# Patient Record
Sex: FEMALE | Race: ASIAN | Employment: UNEMPLOYED | URBAN - METROPOLITAN AREA
[De-identification: names, ages, dates, MRNs, and addresses within clinical notes are randomized per-mention and may not be internally consistent; named-entity substitution may affect disease eponyms.]

---

## 2017-01-09 ENCOUNTER — APPOINTMENT (OUTPATIENT)
Dept: PHYSICAL THERAPY | Facility: HOSPITAL | Age: 32
End: 2017-01-09
Attending: ORTHOPAEDIC SURGERY
Payer: MEDICAID

## 2017-01-11 ENCOUNTER — HOSPITAL ENCOUNTER (OUTPATIENT)
Dept: PHYSICAL THERAPY | Facility: HOSPITAL | Age: 32
Setting detail: THERAPIES SERIES
Discharge: HOME | End: 2017-01-11
Attending: ORTHOPAEDIC SURGERY
Payer: MEDICAID

## 2017-01-11 PROCEDURE — 97161 PT EVAL LOW COMPLEX 20 MIN: CPT

## 2017-01-11 NOTE — PROGRESS NOTES
LOWER EXTREMITY EVALUATION:   Referring Physician: Dr. Malik Bruce  Diagnosis: Vilma Neelylasha Date of Service: 1/11/2017     PATIENT SUMMARY   Stefani Sosa is a 33year old y/o female who presents to therapy today with complaints of R leg pain from h October 2014. Pt goals include improving the pain. Past medical history was reviewed with Gi. Significant findings are unremarkable.     ASSESSMENT  She presents to outpatient skilled physical therapy today with full and pain-free BLE ROM, significantly 5/5; L 5/5       Special tests:   Varus Stress Test: R negative, L negative  Valgus Stress Test: R negative, L negative  Lachman Test: R negative, L negative  Anterior Drawer Test: R negative, L negative  Posterior Drawer Test: R negative, L negative  McMu indicating significantly improved R knee pain and function. NEW GOAL     Frequency / Duration: Patient will be seen for 2 x/week or a total of 8 visits over a 90 day period. Treatment will include: Manual Therapy; Therapeutic Exercises;  Neuromuscular Re-e

## 2017-01-18 ENCOUNTER — HOSPITAL ENCOUNTER (OUTPATIENT)
Dept: PHYSICAL THERAPY | Facility: HOSPITAL | Age: 32
Setting detail: THERAPIES SERIES
Discharge: HOME | End: 2017-01-18
Attending: ORTHOPAEDIC SURGERY
Payer: MEDICAID

## 2017-01-18 PROCEDURE — 97110 THERAPEUTIC EXERCISES: CPT

## 2017-01-18 PROCEDURE — 97140 MANUAL THERAPY 1/> REGIONS: CPT

## 2017-01-18 NOTE — PROGRESS NOTES
Dx: R Knee pain         Authorized # of Visits:  [facility]         Next MD visit: none scheduled  Fall Risk: standard         Precautions: n/a             Subjective: Patient reports that for the past 2 days she was having a lot more knee pain around her R knee proper LE alignment indicating improved glut strength for knee stability with all daily activities.  Progressing - hip/LE strengthening at this time   5.  Patient will demonstrate ability to lift and carry at least 25lbs with proper mechanics without onset

## 2017-01-19 ENCOUNTER — APPOINTMENT (OUTPATIENT)
Dept: PHYSICAL THERAPY | Facility: HOSPITAL | Age: 32
End: 2017-01-19
Attending: ORTHOPAEDIC SURGERY
Payer: MEDICAID

## 2017-01-23 ENCOUNTER — APPOINTMENT (OUTPATIENT)
Dept: PHYSICAL THERAPY | Facility: HOSPITAL | Age: 32
End: 2017-01-23
Attending: ORTHOPAEDIC SURGERY
Payer: MEDICAID

## 2017-01-25 ENCOUNTER — HOSPITAL ENCOUNTER (OUTPATIENT)
Dept: PHYSICAL THERAPY | Facility: HOSPITAL | Age: 32
Setting detail: THERAPIES SERIES
Discharge: HOME | End: 2017-01-25
Attending: ORTHOPAEDIC SURGERY
Payer: MEDICAID

## 2017-01-25 PROCEDURE — 97110 THERAPEUTIC EXERCISES: CPT

## 2017-01-25 NOTE — PROGRESS NOTES
Dx: R Knee pain         Authorized # of Visits:  [facility]         Next MD visit: none scheduled  Fall Risk: standard         Precautions: n/a             Subjective: Patient reports her pain was very good for the past 2 days, but she did not exercise.  She has n daily activities.  Progressing - hip/LE strengthening at this time   5.  Patient will demonstrate ability to lift and carry at least 25lbs with proper mechanics without onset of knee pain to be able to lift her young children without pain.   Not addressed t

## 2017-01-26 ENCOUNTER — APPOINTMENT (OUTPATIENT)
Dept: PHYSICAL THERAPY | Facility: HOSPITAL | Age: 32
End: 2017-01-26
Attending: ORTHOPAEDIC SURGERY
Payer: MEDICAID

## 2017-01-30 ENCOUNTER — APPOINTMENT (OUTPATIENT)
Dept: PHYSICAL THERAPY | Facility: HOSPITAL | Age: 32
End: 2017-01-30
Attending: ORTHOPAEDIC SURGERY
Payer: MEDICAID

## 2017-02-01 ENCOUNTER — APPOINTMENT (OUTPATIENT)
Dept: PHYSICAL THERAPY | Facility: HOSPITAL | Age: 32
End: 2017-02-01
Attending: ORTHOPAEDIC SURGERY
Payer: MEDICAID

## 2017-02-02 ENCOUNTER — HOSPITAL ENCOUNTER (OUTPATIENT)
Dept: PHYSICAL THERAPY | Facility: HOSPITAL | Age: 32
Setting detail: THERAPIES SERIES
Discharge: HOME | End: 2017-02-02
Attending: ORTHOPAEDIC SURGERY
Payer: MEDICAID

## 2017-02-02 PROCEDURE — 97110 THERAPEUTIC EXERCISES: CPT

## 2017-02-02 NOTE — PROGRESS NOTES
Dx: R Knee pain         Authorized # of Visits:  [facility]         Next MD visit: none scheduled  Fall Risk: standard         Precautions: n/a             Subjective: Patient arrived to session 10mins late today.  She reports her knee pain increased after last vi be able to improve quality of sleep.  Progressing   4. Patient will perform single-leg squat with proper LE alignment indicating improved glut strength for knee stability with all daily activities.  Progressing - hip/LE strengthening at this time   5.  Sandra today for improved stability. Discussion of compensations and ways to recognize and prevent compensations with exercises. Continued education on proper alignment and importance of core strength foor hip and knee stability.  Extra re-enforcement today on imp

## 2017-02-06 ENCOUNTER — HOSPITAL ENCOUNTER (OUTPATIENT)
Dept: PHYSICAL THERAPY | Facility: HOSPITAL | Age: 32
Setting detail: THERAPIES SERIES
Discharge: HOME | End: 2017-02-06
Attending: ORTHOPAEDIC SURGERY
Payer: MEDICAID

## 2017-02-06 PROCEDURE — 97110 THERAPEUTIC EXERCISES: CPT

## 2017-02-06 PROCEDURE — 97140 MANUAL THERAPY 1/> REGIONS: CPT

## 2017-02-06 NOTE — PROGRESS NOTES
Dx: R Knee pain         Authorized # of Visits:  [facility]         Next MD visit: none scheduled  Fall Risk: standard         Precautions: n/a             Subjective: Pain was rated 5-6/10 this morning at the inside of her knee, but currently no pain.  For the pa Patient will improve HS flexibility and palpable tissue tightness of RLE to decrease onset of pain and muscle cramping in the middle of the night to be able to improve quality of sleep.  Progressing   4.  Patient will perform single-leg squat with proper LE Marching  2x10 L/R no UE 2x10 with alt opp UE flex x      Prone over SB with opp UE/LE lift  2x10 L/R 2x10 L/R x      Sitting SB table walk-out to feet   2x5 x      Shuttle Squats   BLE  3B x10    Single-leg  1B,1R  x10 L/R BLE  3B x10    Single-leg  1B,1R

## 2017-02-08 ENCOUNTER — APPOINTMENT (OUTPATIENT)
Dept: PHYSICAL THERAPY | Facility: HOSPITAL | Age: 32
End: 2017-02-08
Attending: ORTHOPAEDIC SURGERY
Payer: MEDICAID

## 2017-02-16 ENCOUNTER — HOSPITAL ENCOUNTER (OUTPATIENT)
Dept: PHYSICAL THERAPY | Facility: HOSPITAL | Age: 32
Setting detail: THERAPIES SERIES
Discharge: HOME | End: 2017-02-16
Attending: INTERNAL MEDICINE
Payer: MEDICAID

## 2017-02-16 DIAGNOSIS — M22.2X1 PATELLOFEMORAL STRESS SYNDROME OF RIGHT KNEE: Primary | ICD-10-CM

## 2017-02-16 PROCEDURE — 97110 THERAPEUTIC EXERCISES: CPT

## 2017-02-16 NOTE — PROGRESS NOTES
Dx: R Knee pain         Authorized # of Visits:  [facility]         Next MD visit: none scheduled  Fall Risk: standard         Precautions: n/a             Subjective: Patient reports she still gets pain in her low back. And the knee pain moves places.  It hasn't and decreased R knee strain.  NOT MET - no improvement in strength bilaterally at this time.     3. Patient will improve HS flexibility and palpable tissue tightness of RLE to decrease onset of pain and muscle cramping in the middle of the night to be able Foam  [id number] L/R Foam  [id number] L/R Foam  [id number] L/R Single-leg Fwd step up onto bosu with rebounder  2x10 L/R     Step Ups  Fwd 6inch  2x10 RLE    Lat 6inch  2x10 RLE Fwd 6inch  x10 RLE Fwd 6inch  2x10 RLE    Lat  6inch  2x10 RLE    Round bosu  Fwd/la

## 2017-02-23 ENCOUNTER — HOSPITAL ENCOUNTER (OUTPATIENT)
Dept: PHYSICAL THERAPY | Facility: HOSPITAL | Age: 32
Setting detail: THERAPIES SERIES
Discharge: HOME | End: 2017-02-23
Attending: INTERNAL MEDICINE
Payer: MEDICAID

## 2017-02-23 PROCEDURE — 97110 THERAPEUTIC EXERCISES: CPT

## 2017-02-23 PROCEDURE — 97140 MANUAL THERAPY 1/> REGIONS: CPT

## 2017-02-23 NOTE — PROGRESS NOTES
Dx: R Knee pain         Authorized # of Visits:  [facility] [place]         Next MD visit: none scheduled  Fall Risk: standard         Precautions: n/a             Subjective: Patient reports that when she used to get her period, the pain would increase to 9 or 10.  This and ER strength to at least 4/5 for increased LE stability, proper alignment with daily activities and decreased R knee strain.  NOT MET - no improvement in strength bilaterally at this time.     3. Patient will improve HS flexibility and palpable tissue ti x x x x x    Sidelying clamshells 2x10 L/R  Red theraband  HEP x x x Single-leg squats BUE support x 10 L/R BLE squats on floor with focus on pushing through heels  x10    SLS  Foam  [id number] L/R Foam  [id number] L/R Foam  [id number] L/R Single-leg Fwd step up

## 2017-03-02 ENCOUNTER — APPOINTMENT (OUTPATIENT)
Dept: PHYSICAL THERAPY | Facility: HOSPITAL | Age: 32
End: 2017-03-02
Attending: INTERNAL MEDICINE
Payer: MEDICAID

## 2017-03-13 ENCOUNTER — HOSPITAL ENCOUNTER (OUTPATIENT)
Dept: PHYSICAL THERAPY | Facility: HOSPITAL | Age: 32
Setting detail: THERAPIES SERIES
End: 2017-03-13
Attending: INTERNAL MEDICINE
Payer: MEDICAID

## 2017-10-23 ENCOUNTER — APPOINTMENT (OUTPATIENT)
Dept: PHYSICAL THERAPY | Facility: HOSPITAL | Age: 32
End: 2017-10-23
Attending: INTERNAL MEDICINE
Payer: MEDICAID

## 2017-10-23 ENCOUNTER — OFFICE VISIT (OUTPATIENT)
Dept: PHYSICAL THERAPY | Age: 32
End: 2017-10-23
Attending: INTERNAL MEDICINE
Payer: MEDICAID

## 2017-10-23 DIAGNOSIS — S39.012A BACK STRAIN, INITIAL ENCOUNTER: ICD-10-CM

## 2017-10-23 PROCEDURE — 97161 PT EVAL LOW COMPLEX 20 MIN: CPT

## 2017-10-23 PROCEDURE — 97110 THERAPEUTIC EXERCISES: CPT

## 2017-10-23 NOTE — PROGRESS NOTES
SPINE EVALUATION:   Referring Physician: Dr. Jericho Andrea  Diagnosis: thoracic pain, postural dysfunction     Date of Service: 10/23/2017     PATIENT SUMMARY   Lucia Mari is a 27year old y/o female who presents to therapy today with complaints of thoracic back of thoracic spine, postural stretching and strengthening when pain symptoms go down. Precautions:  None  OBJECTIVE:   Observation/Posture: alert and oriented female.  Forward head and forward rounded shoulders bilaterally  Gait: no gait deficits  Neuro include: Manual Therapy; Therapeutic Exercises; Neuromuscular Re-education; Therapeutic Activity;  Electrical Stim; Mechanical Traction; Pt education; Home exercise program instruction    Education or treatment limitation: None  Rehab Potential:good assumin

## 2017-10-26 ENCOUNTER — APPOINTMENT (OUTPATIENT)
Dept: PHYSICAL THERAPY | Facility: HOSPITAL | Age: 32
End: 2017-10-26
Attending: INTERNAL MEDICINE
Payer: MEDICAID

## 2017-10-30 ENCOUNTER — APPOINTMENT (OUTPATIENT)
Dept: PHYSICAL THERAPY | Facility: HOSPITAL | Age: 32
End: 2017-10-30
Attending: INTERNAL MEDICINE
Payer: MEDICAID

## 2017-10-30 ENCOUNTER — OFFICE VISIT (OUTPATIENT)
Dept: PHYSICAL THERAPY | Age: 32
End: 2017-10-30
Attending: INTERNAL MEDICINE
Payer: MEDICAID

## 2017-10-30 PROCEDURE — 97110 THERAPEUTIC EXERCISES: CPT

## 2017-10-30 NOTE — PROGRESS NOTES
Dx: thoracic pain, postural dysfunction         Authorized # of Visits:  8         Next MD visit: none scheduled  Fall Risk: standard         Precautions: n/a             Subjective: she reports her upper back has been feeling better overall, 2/10 in thora

## 2017-11-02 ENCOUNTER — APPOINTMENT (OUTPATIENT)
Dept: PHYSICAL THERAPY | Facility: HOSPITAL | Age: 32
End: 2017-11-02
Attending: INTERNAL MEDICINE
Payer: MEDICAID

## 2017-11-06 ENCOUNTER — APPOINTMENT (OUTPATIENT)
Dept: PHYSICAL THERAPY | Facility: HOSPITAL | Age: 32
End: 2017-11-06
Attending: INTERNAL MEDICINE
Payer: MEDICAID

## 2017-11-06 ENCOUNTER — OFFICE VISIT (OUTPATIENT)
Dept: PHYSICAL THERAPY | Age: 32
End: 2017-11-06
Attending: INTERNAL MEDICINE
Payer: MEDICAID

## 2017-11-06 PROCEDURE — 97140 MANUAL THERAPY 1/> REGIONS: CPT

## 2017-11-06 PROCEDURE — 97110 THERAPEUTIC EXERCISES: CPT

## 2017-11-09 ENCOUNTER — APPOINTMENT (OUTPATIENT)
Dept: PHYSICAL THERAPY | Facility: HOSPITAL | Age: 32
End: 2017-11-09
Attending: INTERNAL MEDICINE
Payer: MEDICAID

## 2017-11-13 ENCOUNTER — APPOINTMENT (OUTPATIENT)
Dept: PHYSICAL THERAPY | Facility: HOSPITAL | Age: 32
End: 2017-11-13
Attending: INTERNAL MEDICINE
Payer: MEDICAID

## 2017-11-15 ENCOUNTER — APPOINTMENT (OUTPATIENT)
Dept: PHYSICAL THERAPY | Age: 32
End: 2017-11-15
Attending: INTERNAL MEDICINE
Payer: MEDICAID

## 2017-11-15 PROCEDURE — 97110 THERAPEUTIC EXERCISES: CPT

## 2017-11-15 PROCEDURE — 97140 MANUAL THERAPY 1/> REGIONS: CPT

## 2017-11-15 NOTE — PROGRESS NOTES
Dx: thoracic pain, postural dysfunction         Authorized # of Visits:  8         Next MD visit: none scheduled  Fall Risk: standard         Precautions: n/a             Subjective: back is feeling much better overall, minimal pain today.  Able to do the e

## 2017-11-16 ENCOUNTER — APPOINTMENT (OUTPATIENT)
Dept: PHYSICAL THERAPY | Facility: HOSPITAL | Age: 32
End: 2017-11-16
Attending: INTERNAL MEDICINE
Payer: MEDICAID

## 2017-11-20 ENCOUNTER — APPOINTMENT (OUTPATIENT)
Dept: PHYSICAL THERAPY | Age: 32
End: 2017-11-20
Attending: INTERNAL MEDICINE
Payer: MEDICAID

## 2017-11-22 ENCOUNTER — OFFICE VISIT (OUTPATIENT)
Dept: PHYSICAL THERAPY | Age: 32
End: 2017-11-22
Attending: INTERNAL MEDICINE
Payer: MEDICAID

## 2017-11-22 PROCEDURE — 97140 MANUAL THERAPY 1/> REGIONS: CPT

## 2017-11-22 PROCEDURE — 97110 THERAPEUTIC EXERCISES: CPT

## 2017-11-22 NOTE — PROGRESS NOTES
Dx: thoracic pain, postural dysfunction         Authorized # of Visits:  8         Next MD visit: none scheduled  Fall Risk: standard         Precautions: n/a             Subjective: back is feeling much better overall, minimal pain today.  Able to do more min  Total Treatment Time: 45min

## 2017-11-27 ENCOUNTER — OFFICE VISIT (OUTPATIENT)
Dept: PHYSICAL THERAPY | Age: 32
End: 2017-11-27
Attending: INTERNAL MEDICINE
Payer: MEDICAID

## 2017-11-27 PROCEDURE — 97110 THERAPEUTIC EXERCISES: CPT

## 2017-11-27 NOTE — PROGRESS NOTES
Dx: thoracic pain, postural dysfunction         Authorized # of Visits:  8         Next MD visit: none scheduled  Fall Risk: standard         Precautions: n/a             Subjective: her mid back is feeling much better overall, today she reports the right Standing rows, RTB 2x10  Prone MHP 10 min to upper back and neck  Heat at home  Manual cervical distraction int 5 min  Buck YTB x10 B      Prone skin lock, gr 3, T8-T4  Uni PA CT junction-T, gr 3, 2x10 bouts B  ---- ----- Pt ed and to heat at azam

## 2017-12-06 ENCOUNTER — OFFICE VISIT (OUTPATIENT)
Dept: PHYSICAL THERAPY | Age: 32
End: 2017-12-06
Attending: INTERNAL MEDICINE
Payer: MEDICAID

## 2017-12-06 PROCEDURE — 97110 THERAPEUTIC EXERCISES: CPT

## 2017-12-06 NOTE — PROGRESS NOTES
Dx: thoracic pain, postural dysfunction         Authorized # of Visits:  8         Next MD visit: none scheduled  Fall Risk: standard         Precautions: n/a             Subjective: she reports the pain has been worse again over the last few days, she rep trap and levator 10 min  Prone skin lock, gr 4, T4-8  2 bouts Gr 4 T4-8 3 bouts  cerv traction int 5 min  Prone sh ext 2x10     Mini squat 2x10  Prone scap retract 2x10  STM right UT and levator 8 min  Central and uni PA CT junction and T2-4 region gr 3 2x

## 2017-12-13 ENCOUNTER — OFFICE VISIT (OUTPATIENT)
Dept: PHYSICAL THERAPY | Age: 32
End: 2017-12-13
Attending: INTERNAL MEDICINE
Payer: MEDICAID

## 2017-12-13 PROCEDURE — 97110 THERAPEUTIC EXERCISES: CPT

## 2017-12-13 NOTE — PROGRESS NOTES
Dx: thoracic pain, postural dysfunction         Authorized # of Visits:  8         Next MD visit: none scheduled  Fall Risk: standard         Precautions: n/a            Discharge Summary    Pt has attended 8, cancelled 3, and no shown 0 visits in Physical [phone]. Sincerely,  Electronically signed by therapist: Elvie Chen PT      Subjective: she report minimal, 1/10 pain today, she has been doing her home program more frequently. Objective: 0/10 pain following session.    Assessment: low co hip ext on right 2x10  Painful for SL standing on right therefore not done   Standing rows, RTB 2x10  Prone MHP 10 min to upper back and neck  Heat at home  Manual cervical distraction int 5 min  Clamshell YTB x10 B  Seated piriformis stretch 3x30 sec  Sup

## 2018-02-12 ENCOUNTER — OFFICE VISIT (OUTPATIENT)
Dept: OBGYN CLINIC | Facility: CLINIC | Age: 33
End: 2018-02-12

## 2018-02-12 VITALS
TEMPERATURE: 99 F | RESPIRATION RATE: 12 BRPM | HEART RATE: 68 BPM | HEIGHT: 62 IN | DIASTOLIC BLOOD PRESSURE: 82 MMHG | BODY MASS INDEX: 22.45 KG/M2 | WEIGHT: 122 LBS | SYSTOLIC BLOOD PRESSURE: 118 MMHG

## 2018-02-12 DIAGNOSIS — K60.2 ANAL FISSURE: Primary | ICD-10-CM

## 2018-02-12 DIAGNOSIS — L81.9 DISCOLORATION OF SKIN: ICD-10-CM

## 2018-02-12 DIAGNOSIS — L29.9 ITCHING: ICD-10-CM

## 2018-02-12 PROCEDURE — 87070 CULTURE OTHR SPECIMN AEROBIC: CPT | Performed by: NURSE PRACTITIONER

## 2018-02-12 PROCEDURE — 11100 BIOPSY OF SKIN LESION: CPT | Performed by: NURSE PRACTITIONER

## 2018-02-12 PROCEDURE — 99203 OFFICE O/P NEW LOW 30 MIN: CPT | Performed by: NURSE PRACTITIONER

## 2018-02-12 PROCEDURE — 87205 SMEAR GRAM STAIN: CPT | Performed by: NURSE PRACTITIONER

## 2018-02-12 RX ORDER — LIDOCAINE HYDROCHLORIDE 10 MG/ML
1 INJECTION, SOLUTION INFILTRATION; PERINEURAL ONCE
Status: DISCONTINUED | OUTPATIENT
Start: 2018-02-12 | End: 2018-10-23

## 2018-02-12 NOTE — PROGRESS NOTES
Roxi Sosa is a 27year old female. HPI:   Patient presents with:  Vaginal Problem: skin is discolored  For a couple weeks patient has had itching near anus. This occurs intermittently.  When itching it is \"extremely itchy\", but then patient will hav shown how to care for biopsy area while it is healing. Will call with results. Advised call DERM if bx is normal to consult on discoloration. Discussed possibility of vitiligo. 2. Itching  Encouraged to use hydrocortisone PRN. Keep area clean and dry.

## 2018-10-23 PROBLEM — L40.9 PSORIASIS OF SCALP: Status: ACTIVE | Noted: 2018-10-23

## 2018-10-23 PROBLEM — M54.50 CHRONIC RIGHT-SIDED LOW BACK PAIN WITHOUT SCIATICA: Status: ACTIVE | Noted: 2018-10-23

## 2018-10-23 PROBLEM — G89.29 CHRONIC RIGHT-SIDED LOW BACK PAIN WITHOUT SCIATICA: Status: ACTIVE | Noted: 2018-10-23

## 2018-11-07 ENCOUNTER — OFFICE VISIT (OUTPATIENT)
Dept: PHYSICAL THERAPY | Age: 33
End: 2018-11-07
Attending: FAMILY MEDICINE
Payer: MEDICAID

## 2018-11-07 DIAGNOSIS — G89.29 CHRONIC RIGHT-SIDED LOW BACK PAIN WITHOUT SCIATICA: ICD-10-CM

## 2018-11-07 DIAGNOSIS — M79.604 PAIN OF RIGHT LOWER EXTREMITY: ICD-10-CM

## 2018-11-07 DIAGNOSIS — M54.50 CHRONIC RIGHT-SIDED LOW BACK PAIN WITHOUT SCIATICA: ICD-10-CM

## 2018-11-07 PROCEDURE — 97110 THERAPEUTIC EXERCISES: CPT

## 2018-11-07 PROCEDURE — 97162 PT EVAL MOD COMPLEX 30 MIN: CPT

## 2018-11-07 NOTE — PROGRESS NOTES
SPINE EVALUATION:   Referring Physician: Dr. Schaefer Grief  Diagnosis: right sided low back with right sided leg pain     Date of Service: 11/7/2018     PATIENT SUMMARY   Jessy Gonzlaez is a 26year old y/o female who presents to therapy today with complaints of PT, mother of 3 kids. She denies any regular exercise and no longer does previous PT HEP. I am familiar with her in PT as I did see her for neck and thoracic pain in 2017.      Jennifer Cough presents to PT with chronic right sided low back and hip pa negative SLR bilaterally, negative FADIR and scour on both hips    Hip ROM screen was WNL and did not reproduce her pain. Mild right buttock pain noted with end range hip flexion   Difficulty laying supine due to pain in right buttock.        Today’s Treatm [phone]    Sincerely,  Electronically signed by therapist: Missy Severs, PT    Physician's certification required: Yes  I certify the need for these services furnished under this plan of treatment and while under my care.     X______________________

## 2018-11-19 ENCOUNTER — OFFICE VISIT (OUTPATIENT)
Dept: PHYSICAL THERAPY | Age: 33
End: 2018-11-19
Attending: FAMILY MEDICINE
Payer: MEDICAID

## 2018-11-19 PROCEDURE — 97110 THERAPEUTIC EXERCISES: CPT

## 2018-11-19 NOTE — PROGRESS NOTES
Dx: right sided low back with right sided leg pain       Authorized # of Visits:  6         Next MD visit: none scheduled  Fall Risk: standard         Precautions: n/a           Subjective: no c/o low back today, hip pain has been 4-5/10.    Objective: no h

## 2018-11-21 ENCOUNTER — OFFICE VISIT (OUTPATIENT)
Dept: PHYSICAL THERAPY | Age: 33
End: 2018-11-21
Attending: FAMILY MEDICINE
Payer: MEDICAID

## 2018-11-21 PROCEDURE — 97110 THERAPEUTIC EXERCISES: CPT

## 2018-11-21 NOTE — PROGRESS NOTES
Dx: right sided low back with right sided leg pain       Authorized # of Visits:  6         Next MD visit: none scheduled  Fall Risk: standard         Precautions: n/a           Subjective: no c/o low back today, hip pain has been better as well since last

## 2018-11-26 ENCOUNTER — APPOINTMENT (OUTPATIENT)
Dept: PHYSICAL THERAPY | Age: 33
End: 2018-11-26
Attending: FAMILY MEDICINE
Payer: MEDICAID

## 2018-11-28 ENCOUNTER — OFFICE VISIT (OUTPATIENT)
Dept: PHYSICAL THERAPY | Age: 33
End: 2018-11-28
Attending: FAMILY MEDICINE
Payer: MEDICAID

## 2018-11-28 PROCEDURE — 97110 THERAPEUTIC EXERCISES: CPT

## 2018-11-28 NOTE — PROGRESS NOTES
Dx: right sided low back with right sided leg pain       Authorized # of Visits:  6         Next MD visit: none scheduled  Fall Risk: standard         Precautions: n/a           Subjective: have been feeling better both back and hip pain, but did have one

## 2018-12-03 ENCOUNTER — OFFICE VISIT (OUTPATIENT)
Dept: PHYSICAL THERAPY | Age: 33
End: 2018-12-03
Attending: FAMILY MEDICINE
Payer: MEDICAID

## 2018-12-03 PROCEDURE — 97110 THERAPEUTIC EXERCISES: CPT

## 2018-12-03 NOTE — PROGRESS NOTES
Dx: right sided low back with right sided leg pain       Authorized # of Visits:  6         Next MD visit: none scheduled  Fall Risk: standard         Precautions: n/a           Subjective: have been feeling better both back and hip pain, this morning woke med 8 min  PPU x5 bouts  Fig 4 hip stretch 3x30 sec B  3x30 sec B       MHP 10 min prone  MHP 10 min  MPM right glut med 5 min prone 90/90 HS stretch 3x30 right      --- ---- MHP 10 min  SLR HS stretch 3x30 B       Skilled Services: skilled selection of th

## 2018-12-05 ENCOUNTER — OFFICE VISIT (OUTPATIENT)
Dept: PHYSICAL THERAPY | Age: 33
End: 2018-12-05
Attending: FAMILY MEDICINE
Payer: MEDICAID

## 2018-12-05 PROCEDURE — 97110 THERAPEUTIC EXERCISES: CPT

## 2018-12-06 NOTE — PROGRESS NOTES
Dx: right sided low back with right sided leg pain       Authorized # of Visits:  6         Next MD visit: none scheduled  Fall Risk: standard         Precautions: n/a         Progress Summary    Pt has attended 6, cancelled 0, and no shown 0 visits in [facility] precautions, and treatment options and has agreed to actively participate in planning and for this course of care. Thank you for your referral. Please co-sign or sign and return this letter via fax as soon as possible to [phone].  If you have any qu 2 sets, 2 laps  Adv clam x8 bilaterally  Adv clam RTB x8 B  Prone foam roll to glut med 8 min      Clamshell YTB x15  Corner pec stretch 2x30 sec  Standing hip abd YTB x15 B  TM walking 3.0 3 min, jog 1 min  MPM piriformis 5 min      Prone MPM and foam rol

## 2018-12-19 ENCOUNTER — OFFICE VISIT (OUTPATIENT)
Dept: PHYSICAL THERAPY | Age: 33
End: 2018-12-19
Attending: FAMILY MEDICINE
Payer: MEDICAID

## 2018-12-19 PROCEDURE — 97110 THERAPEUTIC EXERCISES: CPT

## 2018-12-19 NOTE — PROGRESS NOTES
Dx: right sided low back with right sided leg pain       Authorized # of Visits:  6         Next MD visit: none scheduled  Fall Risk: standard         Precautions: n/a        Subjective: have been feeling much better overall.  Today I woke up and entire spi Monster walks RTB 2 laps     Clamshell YTB x15  Corner pec stretch 2x30 sec  Standing hip abd YTB x15 B  TM walking 3.0 3 min, jog 1 min  MPM piriformis 5 min  Clam RTB x15 B   Adv clam RTB 2x10     Prone MPM and foam roll to right glut med 8 min  PPU x5 b

## 2019-01-02 ENCOUNTER — OFFICE VISIT (OUTPATIENT)
Dept: PHYSICAL THERAPY | Age: 34
End: 2019-01-02
Attending: FAMILY MEDICINE
Payer: MEDICAID

## 2019-01-02 PROCEDURE — 97110 THERAPEUTIC EXERCISES: CPT

## 2019-01-02 NOTE — PROGRESS NOTES
Dx: right sided low back with right sided leg pain       Authorized # of Visits:  6         Next MD visit: none scheduled  Fall Risk: standard         Precautions: n/a        Subjective: have been feeling much better overall.  Have been doing exercises much GTB  Adv clam x15 bilaterally GTB    Clamshell YTB x15  Corner pec stretch 2x30 sec  Standing hip abd YTB x15 B  TM walking 3.0 3 min, jog 1 min  MPM piriformis 5 min  Clam RTB x15 B   Adv clam RTB 2x10  sidelying adduction x15 B  Standing TB adduction x15

## 2019-01-07 ENCOUNTER — APPOINTMENT (OUTPATIENT)
Dept: PHYSICAL THERAPY | Age: 34
End: 2019-01-07
Attending: FAMILY MEDICINE
Payer: MEDICAID

## 2019-01-09 ENCOUNTER — OFFICE VISIT (OUTPATIENT)
Dept: PHYSICAL THERAPY | Age: 34
End: 2019-01-09
Attending: FAMILY MEDICINE
Payer: MEDICAID

## 2019-01-09 PROCEDURE — 97110 THERAPEUTIC EXERCISES: CPT

## 2019-01-09 NOTE — PROGRESS NOTES
Dx: right sided low back with right sided leg pain       Authorized # of Visits:  6         Next MD visit: none scheduled  Fall Risk: standard         Precautions: n/a        Subjective: have been feeling much better overall.  At times still feel some michael laps  2 laps    Long axis dist right LE, int 8 min  Clamshell RTB 2x10 B  Clamshell YTB 2x10  RTB x15 B  Right long axis hip dist, gr 4, 3x10 bouts  Lateral resisted gait RTB 4 bouts Standing hip abd x15 bilaterally  1/2 squat 2x10    Bridge with adduction

## 2019-01-16 ENCOUNTER — OFFICE VISIT (OUTPATIENT)
Dept: PHYSICAL THERAPY | Age: 34
End: 2019-01-16
Attending: FAMILY MEDICINE
Payer: MEDICAID

## 2019-01-16 PROCEDURE — 97110 THERAPEUTIC EXERCISES: CPT

## 2019-01-16 NOTE — PROGRESS NOTES
Dx: right sided low back with right sided leg pain       Authorized # of Visits:  6         Next MD visit: none scheduled  Fall Risk: standard         Precautions: n/a         Discharge Summary    Pt has attended 10, cancelled 0, and no shown 0 visits in P care.    X___________________________________________________ Date____________________    Certification From: 2/98/8844  To:4/16/2019    Subjective: have been feeling much better overall.  Happy with progress  Objective: see note    Assessment: tolerated ad 90/90 HS stretch 3x30 right Fig 4 right hip stretch 3x30 sec  Long axis hip dist on right, gr 3, 2x10 bouts  CP to right knee, pes anserine region 10 min  CP to right knee 10 min  ---   MHP 10 min  SLR HS stretch 3x30 B  CP 10 min to right hip  MPM to late

## 2019-06-14 ENCOUNTER — OFFICE VISIT (OUTPATIENT)
Dept: FAMILY MEDICINE CLINIC | Facility: CLINIC | Age: 34
End: 2019-06-14
Payer: MEDICAID

## 2019-06-14 VITALS
DIASTOLIC BLOOD PRESSURE: 68 MMHG | HEART RATE: 91 BPM | OXYGEN SATURATION: 99 % | TEMPERATURE: 98 F | WEIGHT: 122 LBS | RESPIRATION RATE: 16 BRPM | SYSTOLIC BLOOD PRESSURE: 104 MMHG | BODY MASS INDEX: 23.95 KG/M2 | HEIGHT: 60 IN

## 2019-06-14 DIAGNOSIS — M79.604 PAIN OF RIGHT LOWER EXTREMITY: ICD-10-CM

## 2019-06-14 DIAGNOSIS — K80.20 CALCULUS OF GALLBLADDER WITHOUT CHOLECYSTITIS WITHOUT OBSTRUCTION: ICD-10-CM

## 2019-06-14 DIAGNOSIS — K59.09 CHRONIC CONSTIPATION: ICD-10-CM

## 2019-06-14 DIAGNOSIS — L40.9 PSORIASIS OF SCALP: ICD-10-CM

## 2019-06-14 DIAGNOSIS — G89.29 CHRONIC PAIN OF RIGHT KNEE: Primary | ICD-10-CM

## 2019-06-14 DIAGNOSIS — M25.561 CHRONIC PAIN OF RIGHT KNEE: Primary | ICD-10-CM

## 2019-06-14 DIAGNOSIS — E55.9 VITAMIN D DEFICIENCY: ICD-10-CM

## 2019-06-14 PROCEDURE — 99204 OFFICE O/P NEW MOD 45 MIN: CPT | Performed by: FAMILY MEDICINE

## 2019-06-14 RX ORDER — AMOXICILLIN AND CLAVULANATE POTASSIUM 875; 125 MG/1; MG/1
TABLET, FILM COATED ORAL
Refills: 0 | COMMUNITY
Start: 2019-02-26 | End: 2019-06-14 | Stop reason: ALTCHOICE

## 2019-06-14 NOTE — PROGRESS NOTES
HPI:    Patient ID: Shilpa Gill is a 26year old female. HPI   33yr old female presents as a new patient with multiple complaints. C/o RLE and knee pain over the past 4yrs.  Symptoms began during her second pregnancy and had progressively worsened aft Allergies:No Known Allergies   PHYSICAL EXAM:   Physical Exam   Constitutional: She is oriented to person, place, and time. She appears well-developed and well-nourished. HENT:   Head: Normocephalic and atraumatic.    Mouth/Throat: Oropharynx is clear prescriptions requested or ordered in this encounter       Imaging & Referrals:  MRI KNEE, RIGHT (CPT=73721)  US ABDOMEN LIMITED (LYW=93514)       ID#[id number]

## 2019-06-21 ENCOUNTER — TELEPHONE (OUTPATIENT)
Dept: FAMILY MEDICINE CLINIC | Facility: CLINIC | Age: 34
End: 2019-06-21

## 2019-06-21 DIAGNOSIS — M25.561 CHRONIC PAIN OF RIGHT KNEE: Primary | ICD-10-CM

## 2019-06-21 DIAGNOSIS — G89.29 CHRONIC PAIN OF RIGHT KNEE: Primary | ICD-10-CM

## 2019-06-21 NOTE — TELEPHONE ENCOUNTER
Dr. Dionicio Benjamin    MRI denied by [facility]    Denial   Received:  Today   Message Contents   [facility] Clinical Staff             Good Morning,     Per [facility] after reviewing clinical info and PT notes test does not meet medical

## 2019-06-21 NOTE — TELEPHONE ENCOUNTER
Called patient and advised MRI was denied and plan to refer to Orthopedics. Contact information given to call for appt.     Michelle Ivory, [facility]   Phone: [phone]

## 2019-08-03 ENCOUNTER — PATIENT MESSAGE (OUTPATIENT)
Dept: FAMILY MEDICINE CLINIC | Facility: CLINIC | Age: 34
End: 2019-08-03

## 2019-08-04 NOTE — TELEPHONE ENCOUNTER
Dr Bartolo Salazar,  This test was denied in June. Please advise alternative      Good Morning,     Per [facility] this test does not meet medical necessity and has been denied.  Per their guidelines:      Based on [employer] Abdomen Imaging Guidelines Section: AB

## 2019-08-04 NOTE — TELEPHONE ENCOUNTER
From: Ameya Wright  To: Chastity Bianchi DO  Sent: 8/3/2019 10:54 PM CDT  Subject: Other    Hello,    I want to follow up for my abdominal test/ultrasound. Did you guys hear anything from my insurance yet?     Thanks,    [employer]

## 2019-08-30 ENCOUNTER — OFFICE VISIT (OUTPATIENT)
Dept: FAMILY MEDICINE CLINIC | Facility: CLINIC | Age: 34
End: 2019-08-30
Payer: MEDICAID

## 2019-08-30 ENCOUNTER — APPOINTMENT (OUTPATIENT)
Dept: LAB | Age: 34
End: 2019-08-30
Attending: FAMILY MEDICINE
Payer: MEDICAID

## 2019-08-30 VITALS
SYSTOLIC BLOOD PRESSURE: 100 MMHG | RESPIRATION RATE: 18 BRPM | OXYGEN SATURATION: 98 % | TEMPERATURE: 98 F | HEART RATE: 81 BPM | WEIGHT: 122 LBS | BODY MASS INDEX: 23.95 KG/M2 | DIASTOLIC BLOOD PRESSURE: 66 MMHG | HEIGHT: 60 IN

## 2019-08-30 DIAGNOSIS — E55.9 VITAMIN D DEFICIENCY: ICD-10-CM

## 2019-08-30 DIAGNOSIS — M54.6 CHRONIC BILATERAL THORACIC BACK PAIN: ICD-10-CM

## 2019-08-30 DIAGNOSIS — G89.29 CHRONIC BILATERAL THORACIC BACK PAIN: ICD-10-CM

## 2019-08-30 DIAGNOSIS — K59.09 CHRONIC CONSTIPATION: ICD-10-CM

## 2019-08-30 DIAGNOSIS — K80.20 CALCULUS OF GALLBLADDER WITHOUT CHOLECYSTITIS WITHOUT OBSTRUCTION: ICD-10-CM

## 2019-08-30 DIAGNOSIS — R10.11 RUQ ABDOMINAL PAIN: Primary | ICD-10-CM

## 2019-08-30 LAB — VIT D+METAB SERPL-MCNC: 17.9 NG/ML (ref 30–100)

## 2019-08-30 PROCEDURE — 82306 VITAMIN D 25 HYDROXY: CPT

## 2019-08-30 PROCEDURE — 36415 COLL VENOUS BLD VENIPUNCTURE: CPT

## 2019-08-30 PROCEDURE — 99214 OFFICE O/P EST MOD 30 MIN: CPT | Performed by: FAMILY MEDICINE

## 2019-08-30 NOTE — PATIENT INSTRUCTIONS
Discharge Instructions for Gallstones  Gallstones form when liquid stored in the gallbladder hardens into pieces of stone-like material. Stones in the gallbladder may or may not cause symptoms. They can cause pain or infection.  You and your healthcare pr © 1751-0202 [employer] [address] [facility], [facility] [place]. All rights reserved. This information is not intended as a substitute for professional medical care. Always follow your healthcare professional's instructions.

## 2019-08-30 NOTE — PROGRESS NOTES
HPI:    Patient ID: Galilea Kincaid is a 37year old female. HPI   34yr old female presents with intermittent RUQ abdominal pain, chronic constipation and thoracic back pain. Hx of gallstones in the past. Has intermittent, RUQ abd pain.  Has been watching to surgery, Dr. Tomasa Beaulieu (MNL=[id number]); Future  - SURGERY - INTERNAL    3.  Chronic constipation  - instructed to increase fiber in the diet by eating a bran cereal in the morning and more fruits and vegetables during the day  - instructed

## 2019-09-03 ENCOUNTER — TELEPHONE (OUTPATIENT)
Dept: ENDOCRINOLOGY CLINIC | Facility: CLINIC | Age: 34
End: 2019-09-03

## 2019-09-03 RX ORDER — ERGOCALCIFEROL 1.25 MG/1
50000 CAPSULE ORAL WEEKLY
Qty: 12 CAPSULE | Refills: 0 | Status: SHIPPED | OUTPATIENT
Start: 2019-09-03 | End: 2019-10-03

## 2019-09-09 ENCOUNTER — HOSPITAL ENCOUNTER (OUTPATIENT)
Dept: ULTRASOUND IMAGING | Age: 34
Discharge: HOME | End: 2019-09-09
Attending: FAMILY MEDICINE
Payer: MEDICAID

## 2019-09-09 DIAGNOSIS — R10.11 RUQ ABDOMINAL PAIN: ICD-10-CM

## 2019-09-09 DIAGNOSIS — K80.20 CALCULUS OF GALLBLADDER WITHOUT CHOLECYSTITIS WITHOUT OBSTRUCTION: ICD-10-CM

## 2019-09-09 PROCEDURE — 76700 US EXAM ABDOM COMPLETE: CPT | Performed by: FAMILY MEDICINE

## 2019-09-13 ENCOUNTER — OFFICE VISIT (OUTPATIENT)
Dept: FAMILY MEDICINE CLINIC | Facility: CLINIC | Age: 34
End: 2019-09-13
Payer: MEDICAID

## 2019-09-13 ENCOUNTER — TELEPHONE (OUTPATIENT)
Dept: FAMILY MEDICINE CLINIC | Facility: CLINIC | Age: 34
End: 2019-09-13

## 2019-09-13 ENCOUNTER — LAB ENCOUNTER (OUTPATIENT)
Dept: LAB | Age: 34
End: 2019-09-13
Attending: FAMILY MEDICINE
Payer: MEDICAID

## 2019-09-13 VITALS
RESPIRATION RATE: 16 BRPM | TEMPERATURE: 98 F | SYSTOLIC BLOOD PRESSURE: 102 MMHG | HEIGHT: 60.43 IN | DIASTOLIC BLOOD PRESSURE: 60 MMHG | WEIGHT: 123.38 LBS | OXYGEN SATURATION: 99 % | BODY MASS INDEX: 23.91 KG/M2 | HEART RATE: 100 BPM

## 2019-09-13 DIAGNOSIS — D18.03 HEMANGIOMA OF LIVER: ICD-10-CM

## 2019-09-13 DIAGNOSIS — R16.0 LIVER MASS, RIGHT LOBE: ICD-10-CM

## 2019-09-13 DIAGNOSIS — E55.9 VITAMIN D DEFICIENCY: ICD-10-CM

## 2019-09-13 DIAGNOSIS — K80.10 CALCULUS OF GALLBLADDER WITH CHRONIC CHOLECYSTITIS WITHOUT OBSTRUCTION: Primary | ICD-10-CM

## 2019-09-13 DIAGNOSIS — Z28.21 INFLUENZA VACCINE REFUSED: ICD-10-CM

## 2019-09-13 LAB
ALBUMIN SERPL-MCNC: 3.7 G/DL (ref 3.4–5)
ALBUMIN/GLOB SERPL: 1.1 {RATIO} (ref 1–2)
ALP LIVER SERPL-CCNC: 37 U/L (ref 37–98)
ALT SERPL-CCNC: 15 U/L (ref 13–56)
ANION GAP SERPL CALC-SCNC: 4 MMOL/L (ref 0–18)
AST SERPL-CCNC: 10 U/L (ref 15–37)
BASOPHILS # BLD AUTO: 0.04 X10(3) UL (ref 0–0.2)
BASOPHILS NFR BLD AUTO: 0.6 %
BILIRUB SERPL-MCNC: 0.3 MG/DL (ref 0.1–2)
BUN BLD-MCNC: 9 MG/DL (ref 7–18)
BUN/CREAT SERPL: 15 (ref 10–20)
CALCIUM BLD-MCNC: 9.1 MG/DL (ref 8.5–10.1)
CHLORIDE SERPL-SCNC: 107 MMOL/L (ref 98–112)
CO2 SERPL-SCNC: 29 MMOL/L (ref 21–32)
CREAT BLD-MCNC: 0.6 MG/DL (ref 0.55–1.02)
DEPRECATED RDW RBC AUTO: 43.7 FL (ref 35.1–46.3)
EOSINOPHIL # BLD AUTO: 0.47 X10(3) UL (ref 0–0.7)
EOSINOPHIL NFR BLD AUTO: 6.6 %
ERYTHROCYTE [DISTWIDTH] IN BLOOD BY AUTOMATED COUNT: 15.8 % (ref 11–15)
GLOBULIN PLAS-MCNC: 3.4 G/DL (ref 2.8–4.4)
GLUCOSE BLD-MCNC: 83 MG/DL (ref 70–99)
HCT VFR BLD AUTO: 39.2 % (ref 35–48)
HGB BLD-MCNC: 12.5 G/DL (ref 12–16)
IMM GRANULOCYTES # BLD AUTO: 0.02 X10(3) UL (ref 0–1)
IMM GRANULOCYTES NFR BLD: 0.3 %
LYMPHOCYTES # BLD AUTO: 2.41 X10(3) UL (ref 1–4)
LYMPHOCYTES NFR BLD AUTO: 33.8 %
M PROTEIN MFR SERPL ELPH: 7.1 G/DL (ref 6.4–8.2)
MCH RBC QN AUTO: 24.5 PG (ref 26–34)
MCHC RBC AUTO-ENTMCNC: 31.9 G/DL (ref 31–37)
MCV RBC AUTO: 76.9 FL (ref 80–100)
MONOCYTES # BLD AUTO: 0.48 X10(3) UL (ref 0.1–1)
MONOCYTES NFR BLD AUTO: 6.7 %
NEUTROPHILS # BLD AUTO: 3.71 X10 (3) UL (ref 1.5–7.7)
NEUTROPHILS # BLD AUTO: 3.71 X10(3) UL (ref 1.5–7.7)
NEUTROPHILS NFR BLD AUTO: 52 %
OSMOLALITY SERPL CALC.SUM OF ELEC: 288 MOSM/KG (ref 275–295)
PLATELET # BLD AUTO: 268 10(3)UL (ref 150–450)
POTASSIUM SERPL-SCNC: 4.5 MMOL/L (ref 3.5–5.1)
RBC # BLD AUTO: 5.1 X10(6)UL (ref 3.8–5.3)
SODIUM SERPL-SCNC: 140 MMOL/L (ref 136–145)
WBC # BLD AUTO: 7.1 X10(3) UL (ref 4–11)

## 2019-09-13 PROCEDURE — 99214 OFFICE O/P EST MOD 30 MIN: CPT | Performed by: FAMILY MEDICINE

## 2019-09-13 PROCEDURE — 80053 COMPREHEN METABOLIC PANEL: CPT

## 2019-09-13 PROCEDURE — 85025 COMPLETE CBC W/AUTO DIFF WBC: CPT

## 2019-09-13 NOTE — TELEPHONE ENCOUNTER
Patient called in said she spoke to the dr and she said she would release her us abdomen completed on 09/09/19 to her mychart , she said however she still cant see it , the dr has reviewed and signed so I am not sure as to why patient cant see it .

## 2019-09-13 NOTE — PROGRESS NOTES
HPI:    Patient ID: Tricia Olmos is a 37year old female. HPI  34yr old female presents for f/u on recent vit d deficiency and abdominal US for hx of gallstones. Has been watching her diet to control flare ups of RUQ abd pain.      Review of Systems   Co 3mo, then vit d3 2000u daily otc  - she refuses influenza vaccine  - she understands and agrees with the rest of the plan  - RTC as needed    Orders Placed This Encounter      CBC W Differential W Platelet [E]      Comp Metabolic Panel (14) [E]      Meds T

## 2019-09-13 NOTE — TELEPHONE ENCOUNTER
VMML for patient advising US results have been released and it appears that she has viewed them. . If she is still unable to view them instructed to call Yadio help at [phone].

## 2019-09-18 ENCOUNTER — HOSPITAL ENCOUNTER (OUTPATIENT)
Dept: MRI IMAGING | Age: 34
Discharge: HOME | End: 2019-09-18
Attending: FAMILY MEDICINE
Payer: MEDICAID

## 2019-09-18 DIAGNOSIS — R16.0 LIVER MASS, RIGHT LOBE: ICD-10-CM

## 2019-09-18 DIAGNOSIS — D18.03 HEMANGIOMA OF LIVER: ICD-10-CM

## 2019-09-20 ENCOUNTER — HOSPITAL ENCOUNTER (OUTPATIENT)
Dept: MRI IMAGING | Age: 34
Discharge: HOME | End: 2019-09-20
Attending: FAMILY MEDICINE
Payer: MEDICAID

## 2019-09-20 PROCEDURE — 74183 MRI ABD W/O CNTR FLWD CNTR: CPT | Performed by: FAMILY MEDICINE

## 2019-09-20 PROCEDURE — A9581 GADOXETATE DISODIUM INJ: HCPCS | Performed by: FAMILY MEDICINE

## 2019-09-24 ENCOUNTER — TELEPHONE (OUTPATIENT)
Dept: FAMILY MEDICINE CLINIC | Facility: CLINIC | Age: 34
End: 2019-09-24

## 2019-09-25 NOTE — TELEPHONE ENCOUNTER
Notes recorded by Ladi Brantley DO on 9/25/2019 at 9:29 AM CDT  Pls inform pt that MRI again demonstrates gallbladder full of stones and a benign hemangioma of the liver that does not require further w/u.  Pls advise pt to schedule appt with surger

## 2020-01-07 ENCOUNTER — OFFICE VISIT (OUTPATIENT)
Dept: FAMILY MEDICINE CLINIC | Facility: CLINIC | Age: 35
End: 2020-01-07
Payer: MEDICAID

## 2020-01-07 VITALS
RESPIRATION RATE: 16 BRPM | WEIGHT: 119.38 LBS | HEART RATE: 91 BPM | SYSTOLIC BLOOD PRESSURE: 100 MMHG | BODY MASS INDEX: 23.13 KG/M2 | DIASTOLIC BLOOD PRESSURE: 64 MMHG | OXYGEN SATURATION: 99 % | TEMPERATURE: 98 F | HEIGHT: 60.43 IN

## 2020-01-07 DIAGNOSIS — R21 RASH AND NONSPECIFIC SKIN ERUPTION: Primary | ICD-10-CM

## 2020-01-07 PROCEDURE — 99214 OFFICE O/P EST MOD 30 MIN: CPT | Performed by: FAMILY MEDICINE

## 2020-01-07 NOTE — PROGRESS NOTES
HPI:    Patient ID: Griselda Puller is a 37year old female. HPI   34yr old female presents with c/o an itchy rash around her neck that began about 3-4wks ago. Initially started off with itchiness and then noticed small bumps.  The bumps have resolved but s

## 2020-04-17 ENCOUNTER — TELEPHONE (OUTPATIENT)
Dept: FAMILY MEDICINE CLINIC | Facility: CLINIC | Age: 35
End: 2020-04-17

## 2020-04-17 ENCOUNTER — TELEMEDICINE (OUTPATIENT)
Dept: FAMILY MEDICINE CLINIC | Facility: CLINIC | Age: 35
End: 2020-04-17

## 2020-04-17 VITALS — BODY MASS INDEX: 23 KG/M2 | WEIGHT: 120 LBS | TEMPERATURE: 97 F

## 2020-04-17 DIAGNOSIS — J01.10 ACUTE FRONTAL SINUSITIS, RECURRENCE NOT SPECIFIED: Primary | ICD-10-CM

## 2020-04-17 PROCEDURE — 99213 OFFICE O/P EST LOW 20 MIN: CPT | Performed by: FAMILY MEDICINE

## 2020-04-17 RX ORDER — AMOXICILLIN 875 MG/1
875 TABLET, COATED ORAL 2 TIMES DAILY
Qty: 20 TABLET | Refills: 0 | Status: SHIPPED | OUTPATIENT
Start: 2020-04-17 | End: 2020-04-27

## 2020-04-17 RX ORDER — CLOBETASOL PROPIONATE 0.46 MG/ML
SOLUTION TOPICAL
COMMUNITY
Start: 2020-02-11 | End: 2021-03-13

## 2020-04-17 RX ORDER — FLUTICASONE PROPIONATE 50 MCG
2 SPRAY, SUSPENSION (ML) NASAL DAILY
Qty: 1 BOTTLE | Refills: 2 | Status: SHIPPED | OUTPATIENT
Start: 2020-04-17 | End: 2021-03-13

## 2020-04-17 NOTE — PROGRESS NOTES
Video visit:    Please note that the following visit was completed using two-way, real-time interactive audio and/or video communication.   This has been done in good deonna to provide continuity of care in the best interest of the provider-patient relations kg/m²   GENERAL: well developed, well nourished,in no apparent distress  EYES: EOMI   HEENT: atraumatic, normocephalic, when asked to tap on sinuses pt c/o pressure/discomfort around frontal sinuses  LUNGS: effort normal    ASSESSMENT AND PLAN:   Renea Mcqueen

## 2020-09-12 ENCOUNTER — E-VISIT (OUTPATIENT)
Dept: TELEHEALTH | Age: 35
End: 2020-09-12

## 2020-09-12 DIAGNOSIS — R59.0 LYMPHADENOPATHY, POSTERIOR CERVICAL: ICD-10-CM

## 2020-09-12 DIAGNOSIS — J02.9 PHARYNGITIS, UNSPECIFIED ETIOLOGY: Primary | ICD-10-CM

## 2020-09-12 PROCEDURE — 99422 OL DIG E/M SVC 11-20 MIN: CPT | Performed by: NURSE PRACTITIONER

## 2020-09-12 RX ORDER — AMOXICILLIN 875 MG/1
875 TABLET, COATED ORAL 2 TIMES DAILY
Qty: 20 TABLET | Refills: 0 | Status: SHIPPED | OUTPATIENT
Start: 2020-09-12 | End: 2020-09-30

## 2020-09-12 NOTE — PROGRESS NOTES
Sameer Ana is a 38year old female. HPI:   See answers to questions above. Current Outpatient Medications   Medication Sig Dispense Refill   • amoxicillin 875 MG Oral Tab Take 1 tablet (875 mg total) by mouth 2 (two) times daily.  20 tablet 0   • C

## 2020-09-12 NOTE — PATIENT INSTRUCTIONS
Self-Care for Sore Throats    Sore throats happen for many reasons, such as colds, allergies, and infections caused by viruses or bacteria. In any case, your throat becomes red and sore.  Your goal for self-care is to reduce your discomfort while giving you Contact your healthcare provider if you have:  · A temperature over 101°F (38.3°C)  · White spots on the throat  · Great difficulty swallowing  · Trouble breathing  · A skin rash  · Recent exposure to someone else with strep bacteria  · Severe hoarseness a Lymphadenopathy can also be caused by:  · Infection of a lymph node or small group of nodes (lymphadenitis)  · Cancer  · Reactions to medicines such as antibiotics and certain blood pressure, gout, and seizure medicines  · Other health conditions, such as · Antibiotic or antiviral medicine to treat a bacterial or viral infection  · Incision and drainage of a lymph node for lymphadenitis  · Other medicines or procedures to treat the cause of the enlarged nodes  You may need a follow-up exam in 3 to 4 weeks t

## 2020-09-30 ENCOUNTER — OFFICE VISIT (OUTPATIENT)
Dept: OBGYN CLINIC | Facility: CLINIC | Age: 35
End: 2020-09-30
Payer: MEDICAID

## 2020-09-30 VITALS
HEIGHT: 62 IN | BODY MASS INDEX: 23.37 KG/M2 | WEIGHT: 127 LBS | TEMPERATURE: 98 F | SYSTOLIC BLOOD PRESSURE: 90 MMHG | DIASTOLIC BLOOD PRESSURE: 60 MMHG | RESPIRATION RATE: 12 BRPM | HEART RATE: 64 BPM

## 2020-09-30 DIAGNOSIS — L29.2 VULVAR ITCHING: ICD-10-CM

## 2020-09-30 DIAGNOSIS — N89.8 VAGINAL LESION: Primary | ICD-10-CM

## 2020-09-30 PROCEDURE — 3008F BODY MASS INDEX DOCD: CPT | Performed by: OBSTETRICS & GYNECOLOGY

## 2020-09-30 PROCEDURE — 3078F DIAST BP <80 MM HG: CPT | Performed by: OBSTETRICS & GYNECOLOGY

## 2020-09-30 PROCEDURE — 99202 OFFICE O/P NEW SF 15 MIN: CPT | Performed by: OBSTETRICS & GYNECOLOGY

## 2020-09-30 PROCEDURE — 3074F SYST BP LT 130 MM HG: CPT | Performed by: OBSTETRICS & GYNECOLOGY

## 2020-10-02 NOTE — PROGRESS NOTES
CHIEF COMPLAINT:   Patient presents with vulvar lesions with itching  HPI:   Jimena Oakley is a 38year old  Patient's last menstrual period was 2020 (approximate). who presents with bumps on the vulvar area with itching.   She denies exposure t lesion      2.  Vulvar itching  Plan on biopsy of lesions prior to treatment        Braxton Edwards MD

## 2020-10-30 ENCOUNTER — TELEPHONE (OUTPATIENT)
Dept: FAMILY MEDICINE CLINIC | Facility: CLINIC | Age: 35
End: 2020-10-30

## 2020-10-30 DIAGNOSIS — M25.561 CHRONIC PAIN OF RIGHT KNEE: Primary | ICD-10-CM

## 2020-10-30 DIAGNOSIS — G89.29 CHRONIC PAIN OF RIGHT KNEE: Primary | ICD-10-CM

## 2020-10-30 NOTE — TELEPHONE ENCOUNTER
Pt called asking for the name of the ortho dr that Dr Blaine Jain gave her the referral for. Referral was issued June 2019. No longer valid. Pt does not want to start from scratch on this.   Wants the referral to be updated so she can call for an ortho appointme

## 2021-03-22 PROBLEM — M25.551 RIGHT HIP PAIN: Status: ACTIVE | Noted: 2021-03-22

## 2024-11-26 ENCOUNTER — HOSPITAL ENCOUNTER (OUTPATIENT)
Dept: ULTRASOUND IMAGING | Age: 39
Discharge: HOME | End: 2024-11-26
Attending: OBSTETRICS & GYNECOLOGY
Payer: MEDICAID

## 2024-11-26 DIAGNOSIS — N63.0 MASS OF BREAST, UNSPECIFIED LATERALITY: ICD-10-CM

## 2024-11-26 PROCEDURE — 76641 ULTRASOUND BREAST COMPLETE: CPT | Performed by: OBSTETRICS & GYNECOLOGY

## 2025-01-10 ENCOUNTER — TELEPHONE (OUTPATIENT)
Dept: MAMMOGRAPHY | Facility: HOSPITAL | Age: 40
End: 2025-01-10

## 2025-01-10 NOTE — TELEPHONE ENCOUNTER
Follow up call to patient re: BIRADS 5 recommended breast biopsy from 11-26-24. Patient is pregnancy at this time. Spoke to patient re: biopsy recommendation. Patient confirms that she is aware that this is highly suspicious. Asked patient what her thoughts are on the biopsy and patient stated \"not much\"  Patient has an OB appt this week and encouraged her to discuss this with her physician. Patient states that she plans to do this. Patient was \"thankful for the concern\". RN will continue to follow up.

## 2025-01-10 NOTE — TELEPHONE ENCOUNTER
Also, called patient's OB office and spoke to TORO Woo. Amna states that Dr Sue called and spoke to patient in length and recommended that she proceed with the biopsy. Patient had an appt yesterday with Dr Willett but she cancelled it. Per Amna, patient has an appt on 1-15-25 with Maternal Fetal Medicine (Dr Leiva) and also an appt with Dr Sue again on 1-16-25. Amna will inform Dr Leiva's office about recommended biopsy.

## 2025-01-15 ENCOUNTER — ULTRASOUND ENCOUNTER (OUTPATIENT)
Dept: PERINATAL CARE | Facility: HOSPITAL | Age: 40
End: 2025-01-15
Attending: OBSTETRICS & GYNECOLOGY
Payer: MEDICAID

## 2025-01-15 VITALS
DIASTOLIC BLOOD PRESSURE: 77 MMHG | HEART RATE: 114 BPM | BODY MASS INDEX: 26.68 KG/M2 | SYSTOLIC BLOOD PRESSURE: 127 MMHG | HEIGHT: 62 IN | WEIGHT: 145 LBS

## 2025-01-15 DIAGNOSIS — O09.529 AMA (ADVANCED MATERNAL AGE) MULTIGRAVIDA 35+ (HCC): ICD-10-CM

## 2025-01-15 DIAGNOSIS — O09.522 MULTIGRAVIDA OF ADVANCED MATERNAL AGE IN SECOND TRIMESTER (HCC): ICD-10-CM

## 2025-01-15 DIAGNOSIS — N63.22 MASS OF UPPER INNER QUADRANT OF LEFT BREAST: ICD-10-CM

## 2025-01-15 DIAGNOSIS — O09.522 MULTIGRAVIDA OF ADVANCED MATERNAL AGE IN SECOND TRIMESTER (HCC): Primary | ICD-10-CM

## 2025-01-15 PROCEDURE — 76811 OB US DETAILED SNGL FETUS: CPT | Performed by: OBSTETRICS & GYNECOLOGY

## 2025-01-15 RX ORDER — CHOLECALCIFEROL (VITAMIN D3) 25 MCG
1 TABLET,CHEWABLE ORAL DAILY
COMMUNITY

## 2025-01-15 NOTE — PROGRESS NOTES
Outpatient Maternal-Fetal Medicine Consultation    Dear Dr. Sue    Thank you for requesting ultrasound evaluation and maternal fetal medicine consultation on your patient Gi Ruiz.  As you are aware she is a 39 year old female  with a munroe pregnancy and an Estimated Date of Delivery: 25.  A maternal-fetal medicine consultation was requested secondary to Advanced Maternal Age.  Her prenatal records and labs were reviewed.      Her previous pregnancies were uncomplicated.  She is here today with her mother.  When I asked if she had any health issues, she said no.  I asked about the ultrasound reports and she said that that was concerning.  ROS    HISTORY  OB History    Para Term  AB Living   4 3 3     3   SAB IAB Ectopic Multiple Live Births         0 3      # Outcome Date GA Lbr Anastacio/2nd Weight Sex Type Anes PTL Lv   4 Current            3 Term 16 39w1d 04:17 / 00:11 6 lb 9 oz (2.977 kg) M NORMAL SPONT None N VANESSA   2 Term 10/27/14 40w6d 03:41 / 01:03 7 lb 13 oz (3.545 kg) M NORMAL SPONT EPI N VANESSA      Complications: Group B beta strep +   1 Term 05/15/13 40w6d 08:44 / 00:37 6 lb 12 oz (3.062 kg) F NORMAL SPONT LOCAL N VANESSA      Birth Comments: cord around rgt ankle x2 tight       Allergies:  Allergies[1]   Current Meds:  Current Outpatient Medications   Medication Sig Dispense Refill    prenatal vitamin with DHA 27-0.8-228 MG Oral Cap Take 1 capsule by mouth daily.          HISTORY:  Past Medical History:    Gallbladder stone with nonacute cholecystitis    Right leg pain    chronic---for years--improved with PT      No past surgical history on file.   Family History   Problem Relation Age of Onset    High Blood Pressure Mother       Social History     Socioeconomic History    Marital status:    Tobacco Use    Smoking status: Never    Smokeless tobacco: Never   Vaping Use    Vaping status: Never Used   Substance and Sexual Activity    Alcohol use: No    Drug use: No   Other  Topics Concern    Caffeine Concern No    Exercise No    Seat Belt Yes   Social History Narrative        3 children    -homemaker    -no tobacco    -no ETOH    -no illicits    -uses condoms           PHYSICAL EXAMINATION:  /77 (BP Location: Right arm, Patient Position: Sitting, Cuff Size: adult)   Pulse 114   Ht 5' 2\" (1.575 m)   Wt 145 lb (65.8 kg)   LMP 08/06/2024   BMI 26.52 kg/m²   Physical Exam  Constitutional:       Appearance: Normal appearance.   Abdominal:      Palpations: Abdomen is soft.      Tenderness: There is no abdominal tenderness.   Neurological:      Mental Status: She is alert.   Psychiatric:         Mood and Affect: Mood normal.         Behavior: Behavior normal.           OBSTETRIC ULTRASOUND  The patient had a level II ultrasound today which revealed size consistent with dates and a normal detailed anatomic survey.  Ultrasound Findings:  Single IUP in cephalic presentation.    Placenta is anterior.   A 3 vessel cord is noted.  Cardiac activity is present at 148 bpm   g ( 1 lb 2 oz);   MVP is 4.4 cm .     The fetal measurements are consistent with the established EDC. No ultrasound evidence of structural abnormalities are seen today. The nasal bone is present. No ultrasound evidence of markers for aneuploidy are seen. She understands that ultrasound exam cannot exclude genetic abnormalities and that genetic testing is recommended. The limitations of ultrasound were discussed.     Uterus and adnexa appeared normal  today on US  See PACS/Imaging Tab For Complete Ultrasound Report  I interpreted the results and reviewed them with the patient.    DISCUSSION  During her visit we discussed and reviewed the following issues:  ADVANCED MATERNAL AGE    Background  I reviewed with the patient that pregnancies in women of advanced maternal age (35 or older at delivery) are associated with elevated risks. Specifically, there is a higher rate of:  Fetal  malformations  Preeclampsia  Gestational diabetes  Intrauterine fetal death    As a result, enhanced pregnancy surveillance is advised for these patients including a comprehensive ultrasound to assess for fetal malformations (at 20 weeks) and a third trimester ultrasound assessment for fetal growth (at 32 weeks). In addition, weekly NST's (initiating at 36 weeks gestation for women 35-39 years and at 32 weeks gestation for women 40 years and older) are also advised. Routine obstetric care is more than adequate to assess for gestational diabetes and preeclampsia; hence, no further significant alterations in obstetric care are advised.    Medical Complications    Women 35 years of age or older can expect to experience two to three fold higher rates of hospitalization,  delivery, and pregnancy-related complications when compared to their younger counterparts.  The two most common medical problems complicating these  pregnanccies are hypertension and diabetes.   The incidence of preeclampsia in the general obstetric population is 3 to 4 percent; this increases to 5 to 10 percent in women over age 40 and is as high as 35 percent in women over age 50.   The incidence of gestational diabetes in the general obstetric population is 3 percent, rising to 7 to 12 percent in women over age 40 and 20 percent in women over age 50.  Women 35 years of age or older are more likely to be delivered by . The  delivery rate in the general obstetric population of the [place] is almost 30 percent, compared to almost 50 percent in women over age 40 to 45 and almost 80 percent in women age 50 to 63.          Fetal Death        A decision analysis tool using data from the [place] [employer]  [employer] predicted a strategy of weekly antepartum testing and labor induction would lower the risk of unexplained fetal death in women 35 years of age or older. In this model, weekly testing starting at 36 weeks of  gestation would drop the risk of fetal death from 5.2 to 1.3 per 1000 pregnancies. While a policy of antepartum testing in older women does increase the chance that a women will be induced (71 inductions per fetal death averted) and thereby increases her risk of having a  delivery, only 14 additional cesareans would need to be performed to avert one unexplained fetal death.  Hence, weekly NST's are advised for women of advanced maternal age; testing should be initiated at 36 weeks for women 35-39 years and at 32 weeks for women 40 years and older.    Fetal Malformations    Cardiac malformations, clubfoot, and diaphragmatic hernia appear to occur with increased frequency in offspring of older women. These abnormalities are structural and unrelated to aneuploidy, thus they would not be detected by karyotype analysis.  For these reasons a complete, detailed ultrasound (level II) is advised even if the fetus has a normal karyotype.      Fetal Aneuploidy      Invasive Testing  I offered invasive genetic testing (amniocentesis, chorionic villus sampling) after reviewing the diagnostic accuracy of these tests as well as the procedure associated loss rate (1:500 for genetic amniocentesis).    She ultimately does not desire invasive genetic testing.     We discussed  the increased risk of chromosomal abnormalities associated with advanced maternal age at age  39 year old. She understands that ultrasound exam cannot exclude potential genetic abnormalities.  Her estimated risk based on maternal age at term with any chromosome abnormality is about 1: 38 and with Down Syndrome is about 1: 54.   We also discussed the risks and benefits of having  genetic testing ([facility] and amniocentesis) performed.      Non-invasive Pregnancy Testing (NIPT)  I reviewed current non-invasive screening options. Currently non-invasive pregnancy testing (NIPT) offers the highest detection rate (with the lowest false positive rate) for the  detection of fetal aneuploidy amongst high-risk patients. The limitations of detailed mid-trimester sonography was reviewed with the patient. First trimester screening and second trimester multiple-marker serum serum screening as alternative aneuploidy screening options were also reviewed. However, both of these tests are associated with lower detection and higher false positive rates.    I printed the ultrasound report for her to see that notes 2 masses and a lymph node.  Ultrasound-guided biopsy is recommended for all 3.  She understands and voices understanding of the masses being highly suggestive of malignancy.  Ada shaw stated that she has come to the decision that she does not want a proceed with any biopsy or treatment prior to the end of the pregnancy.  She states that she has done a lot of self prior, self meditation, a lot of thought prior to making this decision.  She said it is quite stressful when doctors and everyone she encounters are encouraging her to do 1 thing while her heart is telling her to do another. she states this is the right decision for her heart.     I went over with her that I recommended that she get the biopsy.  We needed the biopsies to determine if there is malignancy.  Biopsies can be done during pregnancy chemotherapy in general for breast cancer can be used during pregnancy.  The baby has already formed all of its organs, therefore there is not a concern for maldevelopment of an organ.  There can be a risk of a small baby, and we would do monthly growth.  There is a small risk of stillbirth but she also has that risk based on her age.  She again declined stating that she did not want to move forward with further testing or treatment during the pregnancy.  She is relying on spiritual healing and states that that takes time.  I went over with her that this could increase the risk for poor outcome for her which could include an early death.    I told her that if she changes her mind  to please let us know as we are ready to help her.            IMPRESSION:  IUP at 23w1d   AMA --NIPT low risk, declines invasive testing   Left breast masses--highly suggestive of malignancy (BI-RADS 5)    RECOMMENDATIONS:  Continue care with Dr. Sue  Follow-up Growth ultrasound with BPP at 32 weeks.  Weekly NST's at 32 weeks.  Twice weekly testing at 38 weeks - weekly NST and weekly BPP.  Delivery at 39-40 weeks.  Declines biopsies of breast masses.      Thank you for allowing me to participate in the care of your patient.  Please do not hesitate to contact me if additional questions or concerns arise.      Luda Leiva M.D.    55 minutes spent in review of records, patient consultation, documentation and coordination of care.  The relevant clinical matter(s) are summarized above.     Note to patient and family  The 21st Century Cures Act makes medical notes available to patients in the interest of transparency.  However, please be advised that this is a medical document.  It is intended as [id number] communication.  It is written and medical language may contain abbreviations or verbiage that are technical and unfamiliar.  It may appear blunt or direct.  Medical documents are intended to carry relevant information, facts as evident, and the clinical opinion of the practitioner.       [1] No Known Allergies

## 2025-02-14 LAB — HIV RESULT OB: NEGATIVE

## 2025-03-19 ENCOUNTER — ULTRASOUND ENCOUNTER (OUTPATIENT)
Dept: PERINATAL CARE | Facility: HOSPITAL | Age: 40
End: 2025-03-19
Attending: OBSTETRICS & GYNECOLOGY
Payer: MEDICAID

## 2025-03-19 VITALS
BODY MASS INDEX: 27.97 KG/M2 | DIASTOLIC BLOOD PRESSURE: 74 MMHG | SYSTOLIC BLOOD PRESSURE: 115 MMHG | HEIGHT: 62 IN | HEART RATE: 107 BPM | WEIGHT: 152 LBS

## 2025-03-19 DIAGNOSIS — O09.529 AMA (ADVANCED MATERNAL AGE) MULTIGRAVIDA 35+ (HCC): ICD-10-CM

## 2025-03-19 DIAGNOSIS — O09.523 MULTIGRAVIDA OF ADVANCED MATERNAL AGE IN THIRD TRIMESTER (HCC): Primary | ICD-10-CM

## 2025-03-19 DIAGNOSIS — N63.22 MASS OF UPPER INNER QUADRANT OF LEFT BREAST: ICD-10-CM

## 2025-03-19 PROCEDURE — 76816 OB US FOLLOW-UP PER FETUS: CPT | Performed by: OBSTETRICS & GYNECOLOGY

## 2025-03-19 PROCEDURE — 76819 FETAL BIOPHYS PROFIL W/O NST: CPT

## 2025-03-19 NOTE — PROGRESS NOTES
Outpatient Maternal-Fetal Medicine Consultation    Dear Dr. Sue    Thank you for requesting ultrasound evaluation and maternal fetal medicine consultation on your patient Gi Ruiz.  As you are aware she is a 39 year old female  with a [place] pregnancy and an Estimated Date of Delivery: 25.  A maternal-fetal medicine  Her prenatal records and labs were reviewed.      She asks if her baby is growing well.  She wants to make sure her baby is growing well.  Today, she asked ,\"If I had cancer, wouldn't I have symptoms or be losing weight or something.\"  ROS    HISTORY  OB History    Para Term  AB Living   4 3 3     3   SAB IAB Ectopic Multiple Live Births         0 3      # Outcome Date GA Lbr Anastacio/2nd Weight Sex Type Anes PTL Lv   4 Current            3 Term 16 39w1d 04:17 / 00:11 6 lb 9 oz (2.977 kg) M NORMAL SPONT None N VANESSA   2 Term 10/27/14 40w6d 03:41 / 01:03 7 lb 13 oz (3.545 kg) M NORMAL SPONT EPI N VANESSA      Complications: Group B beta strep +   1 Term 05/15/13 40w6d 08:44 / 00:37 6 lb 12 oz (3.062 kg) F NORMAL SPONT LOCAL N VANESSA      Birth Comments: cord around rgt ankle x2 tight       Allergies:  Allergies[1]   Current Meds:  Current Outpatient Medications   Medication Sig Dispense Refill    prenatal vitamin with DHA 27-0.8-228 MG Oral Cap Take 1 capsule by mouth daily.          HISTORY:  Past Medical History:    Gallbladder stone with nonacute cholecystitis    Right leg pain    chronic---for years--improved with PT      No past surgical history on file.   Family History   Problem Relation Age of Onset    High Blood Pressure Mother       Social History     Socioeconomic History    Marital status:    Tobacco Use    Smoking status: Never    Smokeless tobacco: Never   Vaping Use    Vaping status: Never Used   Substance and Sexual Activity    Alcohol use: No    Drug use: No   Other Topics Concern    Caffeine Concern No    Exercise No    Seat Belt Yes   Social History  Narrative        3 children    -homemaker    -no tobacco    -no ETOH    -no illicits    -uses condoms           PHYSICAL EXAMINATION:  /74 (BP Location: Right arm, Patient Position: Sitting, Cuff Size: adult)   Pulse 107   Ht 5' 2\" (1.575 m)   Wt 152 lb (68.9 kg)   LMP 08/06/2024   BMI 27.80 kg/m²   Physical Exam  Constitutional:       Appearance: Normal appearance.   Abdominal:      Palpations: Abdomen is soft.      Tenderness: There is no abdominal tenderness.   Neurological:      Mental Status: She is alert.   Psychiatric:         Mood and Affect: Mood normal.         Behavior: Behavior normal.         OBSTETRIC ULTRASOUND  The patient had a follow-up growth and BPP ultrasound today which revealed normal interval fetal growth and a BPP of 8/8.   Ultrasound Findings:  Single IUP in cephalic presentation.    Placenta is anterior.   A 3 vessel cord is noted.  Cardiac activity is present at 167 bpm  EFW 1939 g ( 4 lb 4 oz); 49%.    YOSELIN is  8.6 cm.  MVP is 3.9 cm  BPP is 8/8.     The fetal measurements are consistent with established EDC. No gross ultrasound evidence of structural abnormalities are seen today. The patient understands that ultrasound cannot rule out all structural and chromosomal abnormalities.   See PACS/Imaging Tab For Complete Ultrasound Report  I interpreted the results and reviewed them with the patient.    DISCUSSION  During her visit we discussed and reviewed the following issues:  ADVANCED MATERNAL AGE    Background  I reviewed with the patient that pregnancies in women of advanced maternal age (35 or older at delivery) are associated with elevated risks. Specifically, there is a higher rate of:  Fetal malformations  Preeclampsia  Gestational diabetes  Intrauterine fetal death     Please see previous MFM detailed discussion.     .  Left breast masses:  Please see previous [facility] detailed discussion.     In answer to her question, it depends on the cancer if a patient is losing  weight or having symptoms.  Some cancers are asymptomatic , but yet are growing.  Some aggressive cancers will cause rapid weight loss.  She said she is planning to get a mammogram postpartum. \"They have not ever done a mammogram.\"  She asks if the mammogram and ultrasound look at different things.  \"Would the mammogram tell for sure if it is cancer?\"  I went over how mammograms and ultrasound are both used to identify masses and cancers, but only a biopsy can determine for sure if it is cancer.  It also depends on if the breast tissue is dense or not and the location of  the mass.  Often it is the radiologist that determines what is the best way of imaging a mass is.  Even if she has a mammogram, she will likely still need an US.  She then asked if she could breastfeed.  I said that it is a good questions.  In general patients that have breast cancer are on chemotherapy during their pregnancy, and that precludes breastfeeding.  It is likely not a good idea to breast feed in the setting of possible breast cancer.  She then asked if she could just breast feed off the right breast.  Waiting until the pregnancy to be over will lead to nneding to do imaging while her body is starting to lactace even if she plans not to breastfeed. The best course of action would likely be to consider proceeding with the biopsy and any imaging prior to delivery.  That way, she would know a clear answer if there is a cancer or not and have a plan that can be efficiently started after delivery.  She is considering this option of imaging and/or biopsy prior to delivery.  She will consider her options.          1hr gtt 115.      IMPRESSION:  IUP at 32w1d    AMA --NIPT low risk, declines invasive testing   Left breast masses--highly suggestive of malignancy (BI-RADS 5)    RECOMMENDATIONS:  Continue care with Dr. Sue  Weekly NST's at 32 weeks.  Twice weekly testing at 38 weeks - weekly NST and weekly BPP.  Delivery at 39-40 weeks.  I  recommended that she consider proceeding with the biopsy prior to delivery so that she could have a clear plan for postpartum ( if treatment is needed or if breastfeeding is to be avoided.). She states she has the phone numbers to call for making an appointment for biopsy.)          Thank you for allowing me to participate in the care of your patient.  Please do not hesitate to contact me if additional questions or concerns arise.      Luda Leiva M.D.    55 minutes spent in review of records, patient consultation, documentation and coordination of care.  The relevant clinical matter(s) are summarized above.     Note to patient and family  The 21st Century Cures Act makes medical notes available to patients in the interest of transparency.  However, please be advised that this is a medical document.  It is intended as [id number] communication.  It is written and medical language may contain abbreviations or verbiage that are technical and unfamiliar.  It may appear blunt or direct.  Medical documents are intended to carry relevant information, facts as evident, and the clinical opinion of the practitioner.         [1] No Known Allergies

## 2025-03-20 ENCOUNTER — HOSPITAL ENCOUNTER (OUTPATIENT)
Facility: HOSPITAL | Age: 40
Discharge: HOME | End: 2025-03-20
Attending: OBSTETRICS & GYNECOLOGY | Admitting: OBSTETRICS & GYNECOLOGY
Payer: MEDICAID

## 2025-03-20 VITALS
SYSTOLIC BLOOD PRESSURE: 106 MMHG | WEIGHT: 153.81 LBS | DIASTOLIC BLOOD PRESSURE: 75 MMHG | HEART RATE: 90 BPM | BODY MASS INDEX: 28 KG/M2 | TEMPERATURE: 98 F

## 2025-03-20 PROCEDURE — 59025 FETAL NON-STRESS TEST: CPT

## 2025-03-20 NOTE — NST
Nonstress Test   Patient: Gi Ruiz    Gestation: 32w2d    NST:       Variability: Moderate           Accelerations: Yes           Decelerations: None            Baseline: 150 BPM           Uterine Irritability: No           Contractions: Not present                                        Acoustic Stimulator: No           Nonstress Test Interpretation: Reactive           Nonstress Test Second Interpretation: Reactive                     Additional Comments    Physician Evaluation      NST Interpretation: Reactive    Disposition:   Discharged    Comments:    =    Candida Willett MD

## 2025-03-20 NOTE — PROGRESS NOTES
Discharge instructions given including labor precautions, kick counts, signs and symptoms of pre eclampsia, and next OB follow up. Patient verbalizes understanding. Left unit ambulatory in good condition.

## 2025-03-27 ENCOUNTER — ULTRASOUND ENCOUNTER (OUTPATIENT)
Dept: PERINATAL CARE | Facility: HOSPITAL | Age: 40
End: 2025-03-27
Attending: OBSTETRICS & GYNECOLOGY
Payer: MEDICAID

## 2025-03-27 ENCOUNTER — HOSPITAL ENCOUNTER (OUTPATIENT)
Facility: HOSPITAL | Age: 40
Discharge: HOME | End: 2025-03-27
Attending: OBSTETRICS & GYNECOLOGY | Admitting: OBSTETRICS & GYNECOLOGY
Payer: MEDICAID

## 2025-03-27 VITALS
SYSTOLIC BLOOD PRESSURE: 119 MMHG | HEART RATE: 87 BPM | RESPIRATION RATE: 18 BRPM | DIASTOLIC BLOOD PRESSURE: 71 MMHG | TEMPERATURE: 98 F

## 2025-03-27 DIAGNOSIS — O28.8 NON-REACTIVE NST (NON-STRESS TEST): Primary | ICD-10-CM

## 2025-03-27 PROCEDURE — 59025 FETAL NON-STRESS TEST: CPT

## 2025-03-27 PROCEDURE — 76819 FETAL BIOPHYS PROFIL W/O NST: CPT | Performed by: OBSTETRICS & GYNECOLOGY

## 2025-03-27 PROCEDURE — 76815 OB US LIMITED FETUS(S): CPT

## 2025-03-27 PROCEDURE — 99214 OFFICE O/P EST MOD 30 MIN: CPT

## 2025-03-27 PROCEDURE — 76818 FETAL BIOPHYS PROFILE W/NST: CPT | Performed by: OBSTETRICS & GYNECOLOGY

## 2025-03-27 PROCEDURE — 90471 IMMUNIZATION ADMIN: CPT

## 2025-03-27 NOTE — DISCHARGE INSTRUCTIONS
Discharge Instructions    Diet: Regular  Activity: Normal activity         General Instructions    Call your OB doctor if: Uterine contractions increasing in intensity and frequency;Fluid leaking from your vagina;Vaginal bleeding;Vaginal or rectal pressure;Uterine contractions 10 minutes or closer for 1 to 2 hours;Decrease in fetal movement;Temperature greater than 100F  Early labor comfort measures: Drink fluids and eat small light meals;Take a walk;Relax, sleep, take a warm bath or shower for 30 minutes or less

## 2025-03-27 NOTE — PROGRESS NOTES
Pt is a 39 year old female admitted to TR5/TRG5-A.     Chief Complaint   Patient presents with    Non-stress Test      Pt is  33w2d intra-uterine pregnancy.  History obtained, consents signed. Oriented to room, staff, and plan of care.      Pt sent from office, per Dr LINK non reactive NST with prolonged deceleration noted.

## 2025-03-27 NOTE — NST
Nonstress Test   Patient: Gi Ruiz    Gestation: 33w2d    NST:       Variability: Moderate           Accelerations: Yes           Decelerations: None            Baseline: 155 BPM           Uterine Irritability: No           Contractions: Not present                                        Acoustic Stimulator: No           Nonstress Test Interpretation: Reactive                                 Additional Comments     Number Of Stages: 1

## 2025-03-27 NOTE — CONSULTS
[facility]  Non-Surgical Consult    Gi Ruiz Patient Status:  Outpatient    8/10/1985 MRN [id number]   Location [facility] LABOR & DELIVERY Attending Suhail Rodney MD   Hosp Day # 0 PCP Alessia Cho MD     SUBJECTIVE:  Reason for Admission:  Decel of FHTs in OB office    History of Present Illness:  Patient is a 39 year old female.    Patient's last menstrual period was 2024.  States baby has been moving well.  No bleeding or leaking.  No cramping.    She has known mass of left breast that is likely malignancy.  Patient has declined further testing.    Medications:  Prescriptions Prior to Admission[1]    Allergies:  Allergies[2]     Past Medical History:  Past Medical History:    Gallbladder stone with nonacute cholecystitis    Right leg pain    chronic---for years--improved with PT       Past Surgical History:  History reviewed. No pertinent surgical history.    Past OB History:  OB History    Para Term  AB Living   4 3 3     3   SAB IAB Ectopic Multiple Live Births         0 3      # Outcome Date GA Lbr Anastacio/2nd Weight Sex Type Anes PTL Lv   4 Current            3 Term 16 39w1d 04:17 / 00:11 6 lb 9 oz (2.977 kg) M NORMAL SPONT None N VANESSA   2 Term 10/27/14 40w6d 03:41 / 01:03 7 lb 13 oz (3.545 kg) M NORMAL SPONT EPI N VANESSA      Complications: Group B beta strep +   1 Term 05/15/13 40w6d 08:44 / 00:37 6 lb 12 oz (3.062 kg) F NORMAL SPONT LOCAL N VANESSA      Birth Comments: cord around rgt ankle x2 tight         Social History:  Social History     Tobacco Use    Smoking status: Never    Smokeless tobacco: Never   Substance Use Topics    Alcohol use: No          OBJECTIVE:  Temp:  [98.1 °F (36.7 °C)] 98.1 °F (36.7 °C)  Pulse:  [87] 87  Resp:  [18] 18  BP: (119)/(71) 119/71  No intake or output data in the 24 hours ending 25 1418    Physical Exam:  General appearance: alert, appears stated age, cooperative, and no distress.   Seems anxious..  Heart: regular rate and  rhythm  NST reactive without decels in L&D      Diagnostics:    OB ULTRASOUND REPORT   See imaging tab for complete ultrasound report or in PACS    Single IUP in cephalic presentation.    Placenta is anterior.   A 3 vessel cord is noted.  Cardiac activity is present at 159 bpm  MVP is 4.6 cm . YOSELIN 16.9 cm    BIOPHYSICAL PROFILE:  Movement:    2/2  Tone:            2/2  Breathin/2  Fluid:             2/2  TOTAL:               ASSESSMENT:  IUP at 33w2d  Reassuring BPP    PLAN:  If no further decels then continue outpatient management  Fetal movement counts  Twice weekly NST    Thank you for allowing me to participate in the care of your patient.  Please do not hesitate to call with any questions or concerns.     Total floor time was 35 minutes in evaluation, consultation, and coordination of care.  Greater than 50% of this time was spent in face to face discussion with the patient.      Wesly Umana D.O.  3/27/2025  2:18 PM         [1]   Medications Prior to Admission   Medication Sig Dispense Refill Last Dose/Taking    prenatal vitamin with DHA 27-0.8-228 MG Oral Cap Take 1 capsule by mouth daily.      [2] No Known Allergies

## 2025-04-03 ENCOUNTER — HOSPITAL ENCOUNTER (OUTPATIENT)
Facility: HOSPITAL | Age: 40
Discharge: HOME | End: 2025-04-03
Attending: OBSTETRICS & GYNECOLOGY | Admitting: OBSTETRICS & GYNECOLOGY
Payer: MEDICAID

## 2025-04-03 VITALS
SYSTOLIC BLOOD PRESSURE: 109 MMHG | DIASTOLIC BLOOD PRESSURE: 63 MMHG | HEIGHT: 62 IN | RESPIRATION RATE: 18 BRPM | HEART RATE: 111 BPM | BODY MASS INDEX: 28.34 KG/M2 | TEMPERATURE: 99 F | WEIGHT: 154 LBS

## 2025-04-03 PROCEDURE — 59025 FETAL NON-STRESS TEST: CPT

## 2025-04-03 PROCEDURE — 99213 OFFICE O/P EST LOW 20 MIN: CPT

## 2025-04-03 NOTE — DISCHARGE INSTRUCTIONS
Discharge Instructions    Diet: Diet as tolerated. Drink 8-10 glasses of daily  Activity: Normal activity         General Instructions    Call your OB doctor if: Fluid leaking from your vagina;Uterine contractions 10 minutes or closer for 1 to 2 hours;Uterine contractions increasing in intensity and frequency;Decrease in fetal movement;Vaginal bleeding;Temperature greater than 100F;Vaginal or rectal pressure

## 2025-04-03 NOTE — PROGRESS NOTES
EFMs applied, FHTs 160. PT was seen in OB office for routine visit and NST. Pt had a non-reactive NST and was sent over for further monitoring. Pt denies ctxs, LOF, and VB. Pt reports +FM. Pt denies any problems with this or previous pregnancies. Pt denies any medical problems. Admission assessment initiated at this time.

## 2025-04-03 NOTE — NST
Nonstress Test   Patient: Gi Ruiz    Gestation: 34w2d    NST:       Variability: Moderate           Accelerations: Yes           Decelerations: None            Baseline: 155 BPM           Uterine Irritability: Yes           Contractions: Irregular                    Contraction Frequency: x3                   Acoustic Stimulator: No           Nonstress Test Interpretation: Reactive           Nonstress Test Second Interpretation: Reactive                     Additional Comments      Physician Evaluation      NST Interpretation: Reactive    Disposition:   Discharged    Comments:        Madeline Maurer MD

## 2025-04-09 ENCOUNTER — OFFICE VISIT (OUTPATIENT)
Dept: PERINATAL CARE | Facility: HOSPITAL | Age: 40
End: 2025-04-09
Attending: OBSTETRICS & GYNECOLOGY
Payer: MEDICAID

## 2025-04-09 VITALS
SYSTOLIC BLOOD PRESSURE: 121 MMHG | BODY MASS INDEX: 28.52 KG/M2 | HEART RATE: 91 BPM | WEIGHT: 155 LBS | DIASTOLIC BLOOD PRESSURE: 85 MMHG | HEIGHT: 62 IN

## 2025-04-09 DIAGNOSIS — O09.529 AMA (ADVANCED MATERNAL AGE) MULTIGRAVIDA 35+ (HCC): ICD-10-CM

## 2025-04-09 DIAGNOSIS — O28.8 NON-REACTIVE NST (NON-STRESS TEST): ICD-10-CM

## 2025-04-09 DIAGNOSIS — O09.523 MULTIGRAVIDA OF ADVANCED MATERNAL AGE IN THIRD TRIMESTER (HCC): ICD-10-CM

## 2025-04-09 DIAGNOSIS — O09.523 MULTIGRAVIDA OF ADVANCED MATERNAL AGE IN THIRD TRIMESTER (HCC): Primary | ICD-10-CM

## 2025-04-09 PROCEDURE — 76815 OB US LIMITED FETUS(S): CPT

## 2025-04-09 PROCEDURE — 76818 FETAL BIOPHYS PROFILE W/NST: CPT | Performed by: OBSTETRICS & GYNECOLOGY

## 2025-04-09 NOTE — PROGRESS NOTES
Pt here for NST  + FM  P states feeling daily irreg uterine cramping, denies vag bleeding and leaking fluid.

## 2025-04-09 NOTE — PROGRESS NOTES
Outpatient Maternal-Fetal Medicine Ultrasound    Dear Dr. Sue    Thank you for requesting a standard ultrasound on your patient Gi Ruiz.  As you are aware she is a 39 year old female  with a munroe pregnancy and an Estimated Date of Delivery: 25. She was sent for a biophysical profile only.    VITAL SIGNS:  Vitals:    25 0937   BP: 121/85   Pulse: 91   Weight: 155 lb (70.3 kg)   Height: 5' 2\" (1.575 m)         Body mass index is 28.35 kg/m².    Ultrasound Findings:  Single IUP in cephalic presentation.    Placenta is anterior.   Cardiac activity is present at 156 bpm  MVP is 4.4 cm . YOSELIN 12.4 cm  BPP is 10/10.   NST was reactive but had minimal variability.  This was her first NST with MFM; therefore, a BPP was also performed.  See Imaging Tab For Complete Ultrasound Report    IMPRESSION:  IUP at 35w1d  BPP: 10/10      Thank you for allowing me to participate in the care of your patient.  Please do not hesitate to contact me if additional questions or concerns arise.      Luda Leiva MD    Note to patient and family  The 21st Century Cures Act makes medical notes available to patients in the interest of transparency.  However, please be advised that this is a medical document.  It is intended as tjcs-on-zcql communication.  It is written and medical language may contain abbreviations or verbiage that are technical and unfamiliar.  It may appear blunt or direct.  Medical documents are intended to carry relevant information, facts as evident, and the clinical opinion of the practitioner.

## 2025-04-16 ENCOUNTER — ULTRASOUND ENCOUNTER (OUTPATIENT)
Dept: PERINATAL CARE | Facility: HOSPITAL | Age: 40
End: 2025-04-16
Attending: OBSTETRICS & GYNECOLOGY
Payer: MEDICAID

## 2025-04-16 VITALS
SYSTOLIC BLOOD PRESSURE: 131 MMHG | DIASTOLIC BLOOD PRESSURE: 88 MMHG | HEART RATE: 96 BPM | WEIGHT: 157 LBS | BODY MASS INDEX: 28.89 KG/M2 | HEIGHT: 62 IN

## 2025-04-16 DIAGNOSIS — N63.22 MASS OF UPPER INNER QUADRANT OF LEFT BREAST: ICD-10-CM

## 2025-04-16 DIAGNOSIS — O09.529 AMA (ADVANCED MATERNAL AGE) MULTIGRAVIDA 35+ (HCC): ICD-10-CM

## 2025-04-16 DIAGNOSIS — O09.523 MULTIGRAVIDA OF ADVANCED MATERNAL AGE IN THIRD TRIMESTER (HCC): Primary | ICD-10-CM

## 2025-04-16 PROCEDURE — 76816 OB US FOLLOW-UP PER FETUS: CPT | Performed by: OBSTETRICS & GYNECOLOGY

## 2025-04-16 PROCEDURE — 76819 FETAL BIOPHYS PROFIL W/O NST: CPT

## 2025-04-16 NOTE — PROGRESS NOTES
Outpatient Maternal-Fetal Medicine Consultation    Dear Dr. Sue    Thank you for requesting ultrasound evaluation and maternal fetal medicine consultation on your patient Gi Ruiz.  As you are aware she is a 39 year old female  with a munroe pregnancy and an Estimated Date of Delivery: 25.  A maternal-fetal medicine  Her prenatal records and labs were reviewed.    Positive fetal movement.   Has not made an phone calls regarding biopsy.  Planning to do this after birth of baby.  ROS    HISTORY  OB History    Para Term  AB Living   4 3 3   3   SAB IAB Ectopic Multiple Live Births      0 3      # Outcome Date GA Lbr Anastacio/2nd Weight Sex Type Anes PTL Lv   4 Current            3 Term 16 39w1d 04:17 / 00:11 6 lb 9 oz (2.977 kg) M NORMAL SPONT None N VANESSA   2 Term 10/27/14 40w6d 03:41 / 01:03 7 lb 13 oz (3.545 kg) M NORMAL SPONT EPI N VANESSA      Complications: Group B beta strep +   1 Term 05/15/13 40w6d 08:44 / 00:37 6 lb 12 oz (3.062 kg) F NORMAL SPONT LOCAL N VANESSA      Birth Comments: cord around rgt ankle x2 tight       Allergies:  Allergies[1]   Current Meds:  Current Outpatient Medications   Medication Sig Dispense Refill    prenatal vitamin with DHA 27-0.8-228 MG Oral Cap Take 1 capsule by mouth in the morning.          HISTORY:  Past Medical History:    Gallbladder stone with nonacute cholecystitis    Right leg pain    chronic---for years--improved with PT      No past surgical history on file.   Family History   Problem Relation Age of Onset    High Blood Pressure Mother       Social History     Socioeconomic History    Marital status:    Tobacco Use    Smoking status: Never    Smokeless tobacco: Never   Vaping Use    Vaping status: Never Used   Substance and Sexual Activity    Alcohol use: No    Drug use: Never   Other Topics Concern    Caffeine Concern No    Exercise No    Seat Belt Yes   Social History Narrative        3 children    -homemaker    -no tobacco    -no  ETOH    -no illicits    -uses condoms      Social Drivers of Health     Food Insecurity: No Food Insecurity (4/3/2025)    NCSS - Food Insecurity     Worried About Running Out of Food in the Last Year: No     Ran Out of Food in the Last Year: No   Transportation Needs: No Transportation Needs (4/3/2025)    NCSS - Transportation     Lack of Transportation: No   Stress: No Stress Concern Present (4/3/2025)    Stress     Feeling of Stress : No   Housing Stability: Not At Risk (4/3/2025)    NCSS - Housing/Utilities     Has Housing: Yes     Worried About Losing Housing: No     Unable to Get Utilities: No   Recent Concern: Housing Stability - At Risk (4/3/2025)    NCSS - Housing/Utilities     Has Housing: No     Worried About Losing Housing: No     Unable to Get Utilities: No          PHYSICAL EXAMINATION:  /88 (BP Location: Right arm, Patient Position: Sitting, Cuff Size: adult)   Pulse 96   Ht 5' 2\" (1.575 m)   Wt 157 lb (71.2 kg)   LMP 08/06/2024   BMI 28.72 kg/m²   Physical Exam  Constitutional:       Appearance: Normal appearance.   Abdominal:      Palpations: Abdomen is soft.      Tenderness: There is no abdominal tenderness.   Neurological:      Mental Status: She is alert.   Psychiatric:         Mood and Affect: Mood normal.         Behavior: Behavior normal.           OBSTETRIC ULTRASOUND  The patient had a follow-up growth and BPP ultrasound today which revealed normal interval fetal growth and a BPP of 8/8.   Ultrasound Findings:  Single IUP in breech presentation.    Placenta is anterior.   A 3 vessel cord is noted.  Cardiac activity is present at 142 bpm  EFW 2729 g ( 6 lb 0 oz); 38%.    YOSELIN is  18.7 cm.  MVP is 6 cm  BPP is 8/8.     The fetal measurements are consistent with established EDC. No gross ultrasound evidence of structural abnormalities are seen today. The patient understands that ultrasound cannot rule out all structural and chromosomal abnormalities.     See PACS/Imaging Tab For  Complete Ultrasound Report  I interpreted the results and reviewed them with the patient.    DISCUSSION  During her visit we discussed and reviewed the following issues:  ADVANCED MATERNAL AGE    Background  I reviewed with the patient that pregnancies in women of advanced maternal age (35 or older at delivery) are associated with elevated risks. Specifically, there is a higher rate of:  Fetal malformations  Preeclampsia  Gestational diabetes  Intrauterine fetal death     Please see previous [facility] detailed discussion.     .  Left breast masses:  Please see previous [facility] detailed discussion.             1hr gtt 115.    Signs and symptoms of preeclampsia were reviewed.      IMPRESSION:  IUP at 36w1d    AMA --NIPT low risk, declines invasive testing   Left breast masses--highly suggestive of malignancy (BI-RADS 5)    RECOMMENDATIONS:  Continue care with Dr. Sue  Weekly NST's at 32 weeks.  Twice weekly testing at 38 weeks - weekly NST and weekly BPP.  Delivery at 39-40 weeks.  I recommended that she consider proceeding with the biopsy prior to delivery so that she could have a clear plan for postpartum ( if treatment is needed or if breastfeeding is to be avoided.). She states she has the phone numbers to call for making an appointment for biopsy.)          Thank you for allowing me to participate in the care of your patient.  Please do not hesitate to contact me if additional questions or concerns arise.      Luda Leiva M.D.    20 minutes spent in review of records, patient consultation, documentation and coordination of care.  The relevant clinical matter(s) are summarized above.     Note to patient and family  The 21st Century Cures Act makes medical notes available to patients in the interest of transparency.  However, please be advised that this is a medical document.  It is intended as swun-nq-lbqa communication.  It is written and medical language may contain abbreviations or verbiage that are technical and  unfamiliar.  It may appear blunt or direct.  Medical documents are intended to carry relevant information, facts as evident, and the clinical opinion of the practitioner.         [1] No Known Allergies

## 2025-04-16 NOTE — PROGRESS NOTES
Pt here for growth ultrasound/BPP  + FM  Pt sates feeling irreg uterine cramping, denies vag bleeding and leaking fluid.

## 2025-04-17 LAB — STREP GP B CULT OB: NEGATIVE

## 2025-04-23 ENCOUNTER — ULTRASOUND ENCOUNTER (OUTPATIENT)
Dept: PERINATAL CARE | Facility: HOSPITAL | Age: 40
End: 2025-04-23
Attending: OBSTETRICS & GYNECOLOGY
Payer: MEDICAID

## 2025-04-23 VITALS
HEART RATE: 101 BPM | SYSTOLIC BLOOD PRESSURE: 135 MMHG | HEIGHT: 62 IN | BODY MASS INDEX: 29.26 KG/M2 | WEIGHT: 159 LBS | DIASTOLIC BLOOD PRESSURE: 88 MMHG

## 2025-04-23 DIAGNOSIS — O09.523 MULTIGRAVIDA OF ADVANCED MATERNAL AGE IN THIRD TRIMESTER (HCC): Primary | ICD-10-CM

## 2025-04-23 DIAGNOSIS — O09.529 AMA (ADVANCED MATERNAL AGE) MULTIGRAVIDA 35+ (HCC): ICD-10-CM

## 2025-04-23 DIAGNOSIS — N63.22 MASS OF UPPER INNER QUADRANT OF LEFT BREAST: ICD-10-CM

## 2025-04-23 PROCEDURE — 76819 FETAL BIOPHYS PROFIL W/O NST: CPT | Performed by: OBSTETRICS & GYNECOLOGY

## 2025-04-23 PROCEDURE — 76815 OB US LIMITED FETUS(S): CPT

## 2025-04-23 NOTE — PROGRESS NOTES
Outpatient Maternal-Fetal Medicine Ultrasound    Dear Dr. Sue    Thank you for requesting a standard ultrasound on your patient Gi Ruiz.  As you are aware she is a 39 year old female  with a munroe pregnancy and an Estimated Date of Delivery: 25. She was sent for a biophysical profile only. Physician only visit.    VITAL SIGNS:  Vitals:    25 1435 25 1447 25 1448   BP: 141/88 (!) 142/92 135/88   Pulse: 108 106 101   Weight: 159 lb (72.1 kg)     Height: 5' 2\" (1.575 m)           Body mass index is 29.08 kg/m².    Ultrasound Findings:  Single IUP in cephalic presentation.    Placenta is anterior.   Cardiac activity is present at 135 bpm  MVP is 4.8 cm . YOSELIN 13.5 cm  BPP is 8/8.     See Imaging Tab For Complete Ultrasound Report    IMPRESSION:  IUP at 37w1d  AMA --NIPT low risk, declines invasive testing   Left breast masses--highly suggestive of malignancy (BI-RADS 5)  BPP: 8/8    RECOMMENDATIONS:  Continue care with Dr. Sue  Twice weekly testing at 38 weeks-weekly NST and weekly BPP  Delivery at 38-39 weeks.    Thank you for allowing me to participate in the care of your patient.  Please do not hesitate to contact me if additional questions or concerns arise.      Luda Leiva MD    Note to patient and family  The 21st Century Cures Act makes medical notes available to patients in the interest of transparency.  However, please be advised that this is a medical document.  It is intended as kgeo-my-ytks communication.  It is written and medical language may contain abbreviations or verbiage that are technical and unfamiliar.  It may appear blunt or direct.  Medical documents are intended to carry relevant information, facts as evident, and the clinical opinion of the practitioner.

## 2025-04-30 ENCOUNTER — OFFICE VISIT (OUTPATIENT)
Dept: PERINATAL CARE | Facility: HOSPITAL | Age: 40
End: 2025-04-30
Attending: OBSTETRICS & GYNECOLOGY
Payer: MEDICAID

## 2025-04-30 VITALS
HEIGHT: 62 IN | DIASTOLIC BLOOD PRESSURE: 83 MMHG | SYSTOLIC BLOOD PRESSURE: 117 MMHG | HEART RATE: 103 BPM | BODY MASS INDEX: 29.44 KG/M2 | WEIGHT: 160 LBS

## 2025-04-30 DIAGNOSIS — O09.523 AMA (ADVANCED MATERNAL AGE) MULTIGRAVIDA 35+, THIRD TRIMESTER (HCC): ICD-10-CM

## 2025-04-30 DIAGNOSIS — N63.22 MASS OF UPPER INNER QUADRANT OF LEFT BREAST: Primary | ICD-10-CM

## 2025-04-30 DIAGNOSIS — N63.22 MASS OF UPPER INNER QUADRANT OF LEFT BREAST: ICD-10-CM

## 2025-04-30 PROCEDURE — 76815 OB US LIMITED FETUS(S): CPT

## 2025-04-30 PROCEDURE — 76819 FETAL BIOPHYS PROFIL W/O NST: CPT | Performed by: OBSTETRICS & GYNECOLOGY

## 2025-04-30 NOTE — PROGRESS NOTES
Outpatient Maternal-Fetal Medicine Ultrasound    Dear Dr. Sue    Thank you for requesting a standard ultrasound on your patient Gi Ruiz.  As you are aware she is a 39 year old female  with a mun[place] pregnancy and an Estimated Date of Delivery: 25. She was sent for a biophysical profile only. Physician only visit.    VITAL SIGNS:  Vitals:    25 1033   BP: 117/83   Pulse: 103   Weight: 160 lb (72.6 kg)   Height: 5' 2\" (1.575 m)         Body mass index is 29.26 kg/m².    Ultrasound Findings:  Single IUP in cephalic presentation.    Placenta is anterior.   Cardiac activity is present at 140 bpm  MVP is 6.2 cm . YOSELIN 20.6 cm  BPP is 8/8.     See Imaging Tab For Complete Ultrasound Report    IMPRESSION:  IUP at 38w1d  AMA --NIPT low risk, declines invasive testing   Left breast masses--highly suggestive of malignancy (BI-RADS 5)  BPP: 8/8    RECOMMENDATIONS:  Continue care with Dr. Sue  Twice weekly testing at 38 weeks-weekly NST and weekly BPP  Induction scheduled 25    Thank you for allowing me to participate in the care of your patient.  Please do not hesitate to contact me if additional questions or concerns arise.      Luda Leiva MD    Note to patient and family  The 21st Century Cures Act makes medical notes available to patients in the interest of transparency.  However, please be advised that this is a medical document.  It is intended as xpdu-sh-sipw communication.  It is written and medical language may contain abbreviations or verbiage that are technical and unfamiliar.  It may appear blunt or direct.  Medical documents are intended to carry relevant information, facts as evident, and the clinical opinion of the practitioner.

## 2025-05-01 ENCOUNTER — TELEPHONE (OUTPATIENT)
Dept: OBGYN UNIT | Facility: HOSPITAL | Age: 40
End: 2025-05-01

## 2025-05-01 NOTE — PROGRESS NOTES
Outpatient Maternal-Fetal Medicine Follow-Up    Dear Dr. Sue    Thank you for requesting ultrasound evaluation and maternal fetal medicine consultation on your patient Gi Ruiz.  As you are aware she is a 39 year old female  with a mun[place] pregnancy and an Estimated Date of Delivery: 25.  She returned to maternal-fetal medicine today for a follow-up visit.  Her history was reviewed from her prior visit and there were no interval changes.    Antepartum Risk Factors  AMA --NIPT low risk, declines invasive testing   Left breast masses--highly suggestive of malignancy (BI-RADS 5)    PHYSICAL EXAMINATION:  Ht 5' 2\" (1.575 m)   Wt 162 lb (73.5 kg)   LMP 2024   BMI 29.63 kg/m²   General: alert and oriented, no acute distress  Abdomen: gravid, soft, non-tender  Extremities: non-tender, no edema    OBSTETRIC ULTRASOUND    Ultrasound Findings:  Single IUP in cephalic presentation.    Placenta is anterior.   A 3 vessel cord is noted.  Cardiac activity is present at 136 bpm  MVP is 6.4 cm . YOSELIN 17.3 cm  BPP is 8/8.     See PACS/Imaging Tab For Complete Ultrasound Report  I interpreted the results and reviewed them with the patient.    DISCUSSION  During her visit we discussed and reviewed the following issues:  ADVANCED MATERNAL AGE  See prior MFM notes for a detailed review.  She did not desire invasive genetic testing.   She has already obtained a low-risk NPIT result and was appropriately reassured.     LEFT BREAST MASS  Per Dr. Sue's note:    Addressed breast mass and need for diagnostic biopsy. Pt believes in spiritual healing and is aware that repeat US may delay a diagnotic test and further delay treatment if needed. We discussed that delay in diagnosis and treatment can impact prognosis. Pt wants repeat breast US at [facility] prior to biopsy. Paper order provided.    IMPRESSION:  IUP at 39w2d  AMA --NIPT low risk, declines invasive testing   Left breast masses--highly suggestive of malignancy  (BI-RADS 5)  BPP: 8/8     RECOMMENDATIONS:  Continue care with Dr. Sue  Twice weekly testing at 38 weeks-weekly NST and weekly BPP  Induction scheduled 5/11/25    Thank you for allowing me to participate in the care of your patient.  Please do not hesitate to contact me if additional questions or concerns arise.      Saleem Renteria M.D.    20 minutes spent in review of records, patient consultation, documentation and coordination of care.  The relevant clinical matter(s) are summarized above.     Note to patient and family  The 21st Century Cures Act makes medical notes available to patients in the interest of transparency.  However, please be advised that this is a medical document.  It is intended as oilf-kb-yoyv communication.  It is written and medical language may contain abbreviations or verbiage that are technical and unfamiliar.  It may appear blunt or direct.  Medical documents are intended to carry relevant information, facts as evident, and the clinical opinion of the practitioner.

## 2025-05-05 ENCOUNTER — OFFICE VISIT (OUTPATIENT)
Dept: PERINATAL CARE | Facility: HOSPITAL | Age: 40
End: 2025-05-05
Attending: OBSTETRICS & GYNECOLOGY
Payer: MEDICAID

## 2025-05-05 VITALS — SYSTOLIC BLOOD PRESSURE: 126 MMHG | DIASTOLIC BLOOD PRESSURE: 80 MMHG | HEART RATE: 109 BPM

## 2025-05-05 DIAGNOSIS — Z3A.38: Primary | ICD-10-CM

## 2025-05-05 DIAGNOSIS — O09.523 MULTIGRAVIDA OF ADVANCED MATERNAL AGE IN THIRD TRIMESTER (HCC): ICD-10-CM

## 2025-05-05 PROCEDURE — 59025 FETAL NON-STRESS TEST: CPT

## 2025-05-05 NOTE — PROGRESS NOTES
Physician Evaluation      NST Interpretation: Reactive     Disposition:   Discharged    Comments:      Baseline   135  , no decels                 regular contractions- irritability     Wesly Umana D.O.  Maternal Fetal Medicine

## 2025-05-08 ENCOUNTER — ULTRASOUND ENCOUNTER (OUTPATIENT)
Dept: PERINATAL CARE | Facility: HOSPITAL | Age: 40
End: 2025-05-08
Attending: OBSTETRICS & GYNECOLOGY
Payer: MEDICAID

## 2025-05-08 VITALS — WEIGHT: 162 LBS | BODY MASS INDEX: 29.81 KG/M2 | HEIGHT: 62 IN

## 2025-05-08 DIAGNOSIS — N63.22 MASS OF UPPER INNER QUADRANT OF LEFT BREAST: Primary | ICD-10-CM

## 2025-05-08 DIAGNOSIS — O09.523 AMA (ADVANCED MATERNAL AGE) MULTIGRAVIDA 35+, THIRD TRIMESTER (HCC): ICD-10-CM

## 2025-05-08 DIAGNOSIS — N63.22 MASS OF UPPER INNER QUADRANT OF LEFT BREAST: ICD-10-CM

## 2025-05-08 PROCEDURE — 76815 OB US LIMITED FETUS(S): CPT

## 2025-05-08 PROCEDURE — 76819 FETAL BIOPHYS PROFIL W/O NST: CPT | Performed by: OBSTETRICS & GYNECOLOGY

## 2025-05-11 ENCOUNTER — APPOINTMENT (OUTPATIENT)
Dept: OBGYN CLINIC | Facility: HOSPITAL | Age: 40
End: 2025-05-11
Payer: MEDICAID

## 2025-05-11 ENCOUNTER — HOSPITAL ENCOUNTER (INPATIENT)
Facility: HOSPITAL | Age: 40
LOS: 2 days | Discharge: HOME | End: 2025-05-13
Attending: OBSTETRICS & GYNECOLOGY | Admitting: OBSTETRICS & GYNECOLOGY
Payer: MEDICAID

## 2025-05-11 PROBLEM — Z34.90 NORMAL PREGNANCY (HCC): Status: ACTIVE | Noted: 2025-05-11

## 2025-05-11 LAB
ANTIBODY SCREEN: NEGATIVE
BASOPHILS # BLD AUTO: 0.03 X10(3) UL (ref 0–0.2)
BASOPHILS NFR BLD AUTO: 0.3 %
EOSINOPHIL # BLD AUTO: 0.07 X10(3) UL (ref 0–0.7)
EOSINOPHIL NFR BLD AUTO: 0.7 %
ERYTHROCYTE [DISTWIDTH] IN BLOOD BY AUTOMATED COUNT: 14.3 %
HCT VFR BLD AUTO: 36.4 % (ref 35–48)
HGB BLD-MCNC: 12.1 G/DL (ref 12–16)
IMM GRANULOCYTES # BLD AUTO: 0.08 X10(3) UL (ref 0–1)
IMM GRANULOCYTES NFR BLD: 0.8 %
LYMPHOCYTES # BLD AUTO: 2.11 X10(3) UL (ref 1–4)
LYMPHOCYTES NFR BLD AUTO: 21.9 %
MCH RBC QN AUTO: 26.2 PG (ref 26–34)
MCHC RBC AUTO-ENTMCNC: 33.2 G/DL (ref 31–37)
MCV RBC AUTO: 79 FL (ref 80–100)
MONOCYTES # BLD AUTO: 0.73 X10(3) UL (ref 0.1–1)
MONOCYTES NFR BLD AUTO: 7.6 %
NEUTROPHILS # BLD AUTO: 6.6 X10 (3) UL (ref 1.5–7.7)
NEUTROPHILS # BLD AUTO: 6.6 X10(3) UL (ref 1.5–7.7)
NEUTROPHILS NFR BLD AUTO: 68.7 %
PLATELET # BLD AUTO: 229 10(3)UL (ref 150–450)
RBC # BLD AUTO: 4.61 X10(6)UL (ref 3.8–5.3)
RH BLOOD TYPE: POSITIVE
T PALLIDUM AB SER QL IA: NONREACTIVE
WBC # BLD AUTO: 9.6 X10(3) UL (ref 4–11)

## 2025-05-11 PROCEDURE — 86900 BLOOD TYPING SEROLOGIC ABO: CPT | Performed by: OBSTETRICS & GYNECOLOGY

## 2025-05-11 PROCEDURE — 86780 TREPONEMA PALLIDUM: CPT | Performed by: OBSTETRICS & GYNECOLOGY

## 2025-05-11 PROCEDURE — 3E0P7VZ INTRODUCTION OF HORMONE INTO FEMALE REPRODUCTIVE, VIA NATURAL OR ARTIFICIAL OPENING: ICD-10-PCS | Performed by: OBSTETRICS & GYNECOLOGY

## 2025-05-11 PROCEDURE — 86901 BLOOD TYPING SEROLOGIC RH(D): CPT | Performed by: OBSTETRICS & GYNECOLOGY

## 2025-05-11 PROCEDURE — 3E033VJ INTRODUCTION OF OTHER HORMONE INTO PERIPHERAL VEIN, PERCUTANEOUS APPROACH: ICD-10-PCS | Performed by: OBSTETRICS & GYNECOLOGY

## 2025-05-11 PROCEDURE — 85025 COMPLETE CBC W/AUTO DIFF WBC: CPT | Performed by: OBSTETRICS & GYNECOLOGY

## 2025-05-11 PROCEDURE — 86850 RBC ANTIBODY SCREEN: CPT | Performed by: OBSTETRICS & GYNECOLOGY

## 2025-05-11 RX ORDER — CITRIC ACID/SODIUM CITRATE 334-500MG
30 SOLUTION, ORAL ORAL AS NEEDED
Status: DISCONTINUED | OUTPATIENT
Start: 2025-05-11 | End: 2025-05-12 | Stop reason: HOSPADM

## 2025-05-11 RX ORDER — IBUPROFEN 600 MG/1
600 TABLET, FILM COATED ORAL ONCE AS NEEDED
Status: DISCONTINUED | OUTPATIENT
Start: 2025-05-11 | End: 2025-05-12 | Stop reason: HOSPADM

## 2025-05-11 RX ORDER — ACETAMINOPHEN 500 MG
500 TABLET ORAL EVERY 6 HOURS PRN
Status: DISCONTINUED | OUTPATIENT
Start: 2025-05-11 | End: 2025-05-12 | Stop reason: HOSPADM

## 2025-05-11 RX ORDER — DEXTROSE, SODIUM CHLORIDE, SODIUM LACTATE, POTASSIUM CHLORIDE, AND CALCIUM CHLORIDE 5; .6; .31; .03; .02 G/100ML; G/100ML; G/100ML; G/100ML; G/100ML
INJECTION, SOLUTION INTRAVENOUS AS NEEDED
Status: DISCONTINUED | OUTPATIENT
Start: 2025-05-11 | End: 2025-05-12 | Stop reason: HOSPADM

## 2025-05-11 RX ORDER — ONDANSETRON 2 MG/ML
4 INJECTION INTRAMUSCULAR; INTRAVENOUS EVERY 6 HOURS PRN
Status: DISCONTINUED | OUTPATIENT
Start: 2025-05-11 | End: 2025-05-12 | Stop reason: HOSPADM

## 2025-05-11 RX ORDER — TERBUTALINE SULFATE 1 MG/ML
0.25 INJECTION SUBCUTANEOUS AS NEEDED
Status: DISCONTINUED | OUTPATIENT
Start: 2025-05-11 | End: 2025-05-12 | Stop reason: HOSPADM

## 2025-05-11 RX ORDER — BUPIVACAINE HCL/0.9 % NACL/PF 0.25 %
5 PLASTIC BAG, INJECTION (ML) EPIDURAL AS NEEDED
Status: DISCONTINUED | OUTPATIENT
Start: 2025-05-11 | End: 2025-05-12

## 2025-05-11 RX ORDER — NALBUPHINE HYDROCHLORIDE 10 MG/ML
2.5 INJECTION INTRAMUSCULAR; INTRAVENOUS; SUBCUTANEOUS
Status: DISCONTINUED | OUTPATIENT
Start: 2025-05-11 | End: 2025-05-12

## 2025-05-11 RX ORDER — SODIUM CHLORIDE 9 MG/ML
10 INJECTION, SOLUTION INTRAMUSCULAR; INTRAVENOUS; SUBCUTANEOUS AS NEEDED
Status: DISCONTINUED | OUTPATIENT
Start: 2025-05-11 | End: 2025-05-12

## 2025-05-11 RX ORDER — HYDROMORPHONE HYDROCHLORIDE 1 MG/ML
1 INJECTION, SOLUTION INTRAMUSCULAR; INTRAVENOUS; SUBCUTANEOUS ONCE
Refills: 0 | Status: DISCONTINUED | OUTPATIENT
Start: 2025-05-11 | End: 2025-05-12

## 2025-05-11 RX ORDER — BUPIVACAINE HYDROCHLORIDE 2.5 MG/ML
30 INJECTION, SOLUTION EPIDURAL; INFILTRATION; INTRACAUDAL; PERINEURAL AS NEEDED
Status: COMPLETED | OUTPATIENT
Start: 2025-05-11 | End: 2025-05-12

## 2025-05-11 RX ORDER — LIDOCAINE HYDROCHLORIDE 20 MG/ML
5 INJECTION, SOLUTION EPIDURAL; INFILTRATION; INTRACAUDAL; PERINEURAL AS NEEDED
Status: DISCONTINUED | OUTPATIENT
Start: 2025-05-11 | End: 2025-05-12

## 2025-05-11 RX ORDER — LIDOCAINE HYDROCHLORIDE AND EPINEPHRINE 15; 5 MG/ML; UG/ML
5 INJECTION, SOLUTION EPIDURAL AS NEEDED
Status: DISCONTINUED | OUTPATIENT
Start: 2025-05-11 | End: 2025-05-12

## 2025-05-11 RX ORDER — ROPIVACAINE HYDROCHLORIDE 5 MG/ML
30 INJECTION, SOLUTION EPIDURAL; INFILTRATION; PERINEURAL AS NEEDED
Status: DISCONTINUED | OUTPATIENT
Start: 2025-05-11 | End: 2025-05-12 | Stop reason: HOSPADM

## 2025-05-11 RX ORDER — ACETAMINOPHEN 500 MG
1000 TABLET ORAL EVERY 6 HOURS PRN
Status: DISCONTINUED | OUTPATIENT
Start: 2025-05-11 | End: 2025-05-12 | Stop reason: HOSPADM

## 2025-05-11 RX ORDER — SODIUM CHLORIDE, SODIUM LACTATE, POTASSIUM CHLORIDE, CALCIUM CHLORIDE 600; 310; 30; 20 MG/100ML; MG/100ML; MG/100ML; MG/100ML
INJECTION, SOLUTION INTRAVENOUS CONTINUOUS
Status: DISCONTINUED | OUTPATIENT
Start: 2025-05-11 | End: 2025-05-12 | Stop reason: HOSPADM

## 2025-05-12 PROCEDURE — 0KQM0ZZ REPAIR PERINEUM MUSCLE, OPEN APPROACH: ICD-10-PCS | Performed by: OBSTETRICS & GYNECOLOGY

## 2025-05-12 RX ORDER — SIMETHICONE 80 MG
80 TABLET,CHEWABLE ORAL 3 TIMES DAILY PRN
Status: DISCONTINUED | OUTPATIENT
Start: 2025-05-12 | End: 2025-05-13

## 2025-05-12 RX ORDER — ACETAMINOPHEN 500 MG
500 TABLET ORAL EVERY 6 HOURS PRN
Status: DISCONTINUED | OUTPATIENT
Start: 2025-05-12 | End: 2025-05-13

## 2025-05-12 RX ORDER — BISACODYL 10 MG
10 SUPPOSITORY, RECTAL RECTAL ONCE AS NEEDED
Status: DISCONTINUED | OUTPATIENT
Start: 2025-05-12 | End: 2025-05-13

## 2025-05-12 RX ORDER — DOCUSATE SODIUM 100 MG/1
100 CAPSULE, LIQUID FILLED ORAL
Status: DISCONTINUED | OUTPATIENT
Start: 2025-05-12 | End: 2025-05-13

## 2025-05-12 RX ORDER — IBUPROFEN 600 MG/1
600 TABLET, FILM COATED ORAL EVERY 6 HOURS
Status: DISCONTINUED | OUTPATIENT
Start: 2025-05-12 | End: 2025-05-13

## 2025-05-12 RX ORDER — ACETAMINOPHEN 500 MG
1000 TABLET ORAL EVERY 6 HOURS PRN
Status: DISCONTINUED | OUTPATIENT
Start: 2025-05-12 | End: 2025-05-13

## 2025-05-12 RX ORDER — AMMONIA 15 % (W/V)
0.3 AMPUL (EA) INHALATION AS NEEDED
Status: DISCONTINUED | OUTPATIENT
Start: 2025-05-12 | End: 2025-05-13

## 2025-05-12 NOTE — H&P
[facility]  History & Physical    Gi Ruiz Patient Status:  Inpatient    8/10/1985 MRN [id number]   Location [facility] LABOR & DELIVERY Attending Candida Willett MD   Hosp Day # 0 PCP Alessia Cho MD     Date of Admission:  2025    SUBJECTIVE:    Gi Ruiz  is a 39 year old  with Estimated Date of Delivery: 25 presenting at 39w5d for induction of labor due to AMA. Denies contractions, vaginal bleeding, and leaking of fluid.  Feeling normal fetal movements.     Her current obstetrical history is significant for L breast mass- L breast ultrasound read as high suspicion for malignancy  and pt declined biopsy. Plans to do postpartum.    Obstetric History:   OB History    Para Term  AB Living   4 3 3   3   SAB IAB Ectopic Multiple Live Births      0 3      # Outcome Date GA Lbr Anastacio/2nd Weight Sex Type Anes PTL Lv   4 Current            3 Term 16 39w1d 04:17 / 00:11 6 lb 9 oz (2.977 kg) M NORMAL SPONT None N VANESSA   2 Term 10/27/14 40w6d 03:41 / 01:03 7 lb 13 oz (3.545 kg) M NORMAL SPONT EPI N VANESSA      Complications: Group B beta strep +   1 Term 05/15/13 40w6d 08:44 / 00:37 6 lb 12 oz (3.062 kg) F NORMAL SPONT LOCAL N VANESSA      Birth Comments: cord around rgt ankle x2 tight     Past Medical History: Past Medical History[1]  Past Social History: Past Surgical History[2]  Family History: Family History[3]  Social History:   Social History     Tobacco Use    Smoking status: Never    Smokeless tobacco: Never   Substance Use Topics    Alcohol use: No       Home Meds: Prescriptions Prior to Admission[4]  Allergies: Allergies[5]    OBJECTIVE:    Temp:  [98.9 °F (37.2 °C)] 98.9 °F (37.2 °C)  Pulse:  [98] 98  Resp:  [18] 18  BP: (135)/(83) 135/83    Lungs:   Normal effort, no respiratory distress   Heart:   Regular rate   Abdomen: Fundus soft between contractions   Fetal Surveillance: 120bpm, moderate variability, pos accelerations, nodecelerations      Cervix:  cloed     Lab Review:  A, Rh+, GBS negative     ASSESSMENT/PLAN:  Gi Riuz is a 39 year old  at 39w5d admitted for IOL due to AMA.      Admit to L&D  CBC, T&S, syphilis   GBS neg, no abx  Analgesics/epidural at patient request   Cytotec for cervical ripening     Candida Willett MD  2025  10:57 PM                     [1]   Past Medical History:   Gallbladder stone with nonacute cholecystitis    Right leg pain    chronic---for years--improved with PT   [2] No past surgical history on file.  [3]   Family History  Problem Relation Age of Onset    High Blood Pressure Mother    [4]   Medications Prior to Admission   Medication Sig Dispense Refill Last Dose/Taking    prenatal vitamin with DHA 27-0.8-228 MG Oral Cap Take 1 capsule by mouth in the morning.   2025   [5] No Known Allergies

## 2025-05-12 NOTE — PROGRESS NOTES
Labor Progress Note  Feeling more uncomfortable with contractions, tolerating well without pain intervention.     Temp:  [98.1 °F (36.7 °C)-98.9 °F (37.2 °C)] 98.1 °F (36.7 °C)  Pulse:  [80-98] 88  Resp:  [18] 18  BP: (124-143)/(78-84) 124/84  FHT:  baseline 130bpm, mod variability, accels appreciated, no decels  [place]:  contractions q2-3 minutes   SVE: 4cm/100%/0sta    Recent Labs   Lab 25   RBC 4.61   HGB 12.1   HCT 36.4   MCV 79.0*   MCH 26.2   MCHC 33.2   RDW 14.3   NEPRELIM 6.60   WBC 9.6   .0         ASSESSMENT/PLAN:  Gi Ruiz is a 39 year old  at 39w6d admitted for IOL due to AMA.    SROM at 0630  S/p PO cytotec at 0645  SVE now 4/100/0  Further expectant management, plan pitocin if contractions space or no further cervical change    Damien Pan, DO     Hospital stay: 1

## 2025-05-12 NOTE — PROGRESS NOTES
Patient transferred in stable condition to [facility].  Bedside report given to TORO Solis.      ID bands matched at bedside.

## 2025-05-12 NOTE — PROGRESS NOTES
Nursing admission note    Pt admitted via wheelchair  ID bands verified  Oriented to room  Safety precautions are initiated  Bed in low position  Teaching initiated  Call light within reach.

## 2025-05-12 NOTE — CM/SW NOTE
reviewed [place] and [place], [place] called and asked to follow up with patient ([place] speaking[place]. PCP has Dr Nereida Mendieta listed as PCP for infant.

## 2025-05-12 NOTE — PLAN OF CARE
Problem: Patient/Family Goals  Goal: Patient/Family Long Term Goal  Description: Patient's Long Term Goal: Outcome: Progressing  Goal: Patient/Family Short Term GoalAdequate pain control with delivery of infant  Interventions:  Pain assessment scores as ordered  Patient scores pain a \"3\" or less  Multidisciplinary care   Nonpharmacologic comfort measures    Problem: PAIN - ADULT  Goal: Verbalizes/displays adequate comfort level or patient's stated pain goal  Description: INTERVENTIONS:- Encourage pt to monitor pain and request assistance- Assess pain using appropriate pain scale- Administer analgesics based on type and severity of pain and evaluate response- Implement non-pharmacological measures as appropriate and evaluate response- Consider cultural and social influences on pain and pain management- Manage/alleviate anxiety- Utilize distraction and/or relaxation techniques- Monitor for opioid side effects- Notify MD/LIP if interventions unsuccessful or patient reports new pain- Anticipate increased pain with activity and pre-medicate as appropriate  Outcome: Progressing     Problem: ANXIETY  Goal: Will report anxiety at manageable levels  Description: INTERVENTIONS:- Administer medication as ordered- Teach and rehearse alternative coping skills- Provide emotional support with 1:1 interaction with staff  Outcome: Progressing

## 2025-05-12 NOTE — PLAN OF CARE
Problem: Patient/Family Goals  Goal: Patient/Family Long Term Goal  Description: Patient's Long Term Goal: Safe and uncomplicated delivery Interventions:- See additional Care Plan goals for specific interventions  Outcome: Completed  Goal: Patient/Family Short Term Goal  Description: Patient's Short Term Goal: No pain medication used during delivery Interventions: - See additional Care Plan goals for specific interventions  Outcome: Completed     Problem: BIRTH - VAGINAL/ SECTION  Goal: Fetal and maternal status remain reassuring during the birth process  Description: INTERVENTIONS:- Monitor vital signs- Monitor fetal heart rate- Monitor uterine activity- Monitor labor progression (vaginal delivery)- DVT prophylaxis (C/S delivery)- Surgical antibiotic prophylaxis (C/S delivery)  Outcome: Completed     Problem: PAIN - ADULT  Goal: Verbalizes/displays adequate comfort level or patient's stated pain goal  Description: INTERVENTIONS:- Encourage pt to monitor pain and request assistance- Assess pain using appropriate pain scale- Administer analgesics based on type and severity of pain and evaluate response- Implement non-pharmacological measures as appropriate and evaluate response- Consider cultural and social influences on pain and pain management- Manage/alleviate anxiety- Utilize distraction and/or relaxation techniques- Monitor for opioid side effects- Notify MD/LIP if interventions unsuccessful or patient reports new pain- Anticipate increased pain with activity and pre-medicate as appropriate  Outcome: Completed     Problem: ANXIETY  Goal: Will report anxiety at manageable levels  Description: INTERVENTIONS:- Administer medication as ordered- Teach and rehearse alternative coping skills- Provide emotional support with 1:1 interaction with staff  Outcome: Completed

## 2025-05-12 NOTE — PROGRESS NOTES
Pt is a , 39.5 weeks, presents to L&D for a scheduled IOL d/t AMA. Pt states +FM, denies any vaginal bleeding or LOF. Hx obtained. POC rev'd with pt, questions answered, pt verbalized understanding.

## 2025-05-12 NOTE — L&D DELIVERY NOTE
Sara, Girl [[id number]]      Labor Events     labor?: No   steroids?: None  Antibiotics received during labor?: No  Rupture date/time: 2025 0630     Rupture type: SROM  Fluid color: Clear  Labor type: Induced Onset of Labor  Induction: Misoprostol  Indications for induction: Other - comment  Induction comment: AMA  Intrapartum & labor complications: None       Labor Length    1st stage: 1h 40m  2nd stage: 0h 10m  3rd stage: 0h 08m       Labor Event Times    Labor onset date/time: 2025 0945  Dilation complete date/time: 2025 1125  Start pushing date/time: 2025 11:25       [place] Presentation    Presentation: Vertex  Position: Left Occiput Anterior       Operative Delivery    Operative Vaginal Delivery: No                Shoulder Dystocia    Shoulder Dystocia: No       Anesthesia    Method: Local   Analgesics:  Analgesics   BUPIVACAINE              Delivery      Head delivery date/time: 2025 11:36:02   Delivery date/time:  25 11:36:06   Delivery type: Normal spontaneous vaginal delivery    Details:     Delivery location: delivery room       Delivery Providers    Delivering Clinician: Damien Pan DO   Delivery personnel:  Provider Role   Anusha Scott, TORO Baby Nurse   Kortney Smith, RN Delivery Nurse             Cord    Vessels: 3 Vessels  Complications: None  Timed cord clamping: Yes  Time in sec: 30  Cord blood disposition: to lab  Gases sent?: No       Resuscitation    Method: None        Measurements      Weight: 2840 g 6 lb 4.2 oz Length: 52.1 cm     Head circum.: 33.8 cm Chest circum.: 31 cm      Abdominal circum.: 27 cm           Placenta    Date/time: 2025 11:45  Removal: Spontaneous  Appearance: Intact  Disposition: held for future pathology       Apgars    Living status: Living   Apgar Scoring Key:    0 1 2    Skin color Blue or pale Acrocyanotic Completely pink    Heart rate Absent <100 bpm >100 bpm    Reflex  irritability No response Grimace Cry or active withdrawal    Muscle tone Limp Some flexion Active motion    Respiratory effort Absent Weak cry; hypoventilation Good, crying              1 Minute:  5 Minute:  10 Minute:  15 Minute:  20 Minute:      Skin color: 1  1       Heart rate: 2  2       Reflex irritablity: 2  2       Muscle tone: 2  2       Respiratory effort: 2  2       Total: 9  9          Apgars assigned by: JOSE LUIS ARNOLD RN  [place] disposition: with mother       Skin to Skin    Skin to skin initiated date/time: 2025  Skin to skin with: Mother       Vaginal Count    Initial count RN: Jazmin Chaparro RN  Initial count Tech: Byriene, Gia   Sponges   Sharps    Initial counts 11   0    Final counts 11   2    Final count RN: Kortney Smith RN  Final count MD: Damien Pan DO       Lacerations    Episiotomy: None  Perineal lacerations: 2nd Repaired?: Yes     Vaginal laceration?: No      Cervical laceration?: No    Clitoral laceration?: No    Quantitative blood loss (mL): [address] [place] [address]   part of [facility]    Vaginal Delivery Note    Gi Sullivanwal Patient Status:  Inpatient    8/10/1985 MRN [id number]   Location [facility] LABOR & DELIVERY Attending Candida Willett MD   Hosp Day # 1 PCP Alessia Cho MD     Delivery     Diagnosis: spontaneous vaginal delivery    Labor Details: Induction    Procedure: spontaneous vaginal delivery    Neonatologist Present: no    Placenta  Date/Time of Delivery: 2025 11:45 AM   Delivery: spontaneous  Placenta to Pathology: no  Cord Gases Submitted: no    Cord Complications: none    Maternal Anesthesia: local   Episiotomy/Laceration Repair  Laceration: perineal 2nd degree    Sponge and Needle Counts:  Verified yes    Delivery Complications  none    Hemorrhage?: No, patient is stable, asymptomatic and blood loss is within expected amount following delivery. Standard treatment provided to prevent PPH. Patient does not meet ACOG  criteria for hemorrhage at this time.    QBL: Quantitative Blood Loss (mL) 28       Delivery Comments:     Labor Course: Gi Ruiz is a 39 year old  who presented for IOL due to AMA.  Pregnancy further with breast mass concerning for malignancy.  For induction, she received cytotec an further progressed expectantly.    Procedure:  The patient was placed in the dorsal lithotomy position and prepped in the usual fashion.  She was encouraged to push.  The head delivered in the GREGORIA position.  Nuchal cord not present.  The shoulders and body were delivered atraumatically.  The infant was dried and suctioned.  The cord was doubly clamped and cut after 30 second delay.  The cord blood was sampled and/or banked.  The placenta delivered with gentle manual traction on the umbilical cord.  Placenta was noted to be intact with a normal 3 vessel cord.  The cervix and vagina were inspected.  A 2nd degree perineal laceration was noted.  The laceration was repaired with 3-0 vicryl in the usual fashion, after which excellent hemostasis was noted.  The fundus was again examined and noted to be firm and well contracted.  Bleeding was minimal.  The patient was then moved to the supine position in stable condition.  Counts were correct.    Damien Pan DO   2025  11:59 AM

## 2025-05-13 VITALS
WEIGHT: 162 LBS | OXYGEN SATURATION: 99 % | BODY MASS INDEX: 30 KG/M2 | RESPIRATION RATE: 16 BRPM | SYSTOLIC BLOOD PRESSURE: 105 MMHG | HEART RATE: 77 BPM | TEMPERATURE: 98 F | DIASTOLIC BLOOD PRESSURE: 73 MMHG

## 2025-05-13 LAB
BASOPHILS # BLD AUTO: 0.03 X10(3) UL (ref 0–0.2)
BASOPHILS NFR BLD AUTO: 0.2 %
EOSINOPHIL # BLD AUTO: 0.16 X10(3) UL (ref 0–0.7)
EOSINOPHIL NFR BLD AUTO: 1.2 %
ERYTHROCYTE [DISTWIDTH] IN BLOOD BY AUTOMATED COUNT: 14.5 %
HCT VFR BLD AUTO: 37.7 % (ref 35–48)
HGB BLD-MCNC: 13 G/DL (ref 12–16)
IMM GRANULOCYTES # BLD AUTO: 0.12 X10(3) UL (ref 0–1)
IMM GRANULOCYTES NFR BLD: 0.9 %
LYMPHOCYTES # BLD AUTO: 2.36 X10(3) UL (ref 1–4)
LYMPHOCYTES NFR BLD AUTO: 18 %
MCH RBC QN AUTO: 26.9 PG (ref 26–34)
MCHC RBC AUTO-ENTMCNC: 34.5 G/DL (ref 31–37)
MCV RBC AUTO: 78.1 FL (ref 80–100)
MONOCYTES # BLD AUTO: 0.68 X10(3) UL (ref 0.1–1)
MONOCYTES NFR BLD AUTO: 5.2 %
NEUTROPHILS # BLD AUTO: 9.74 X10 (3) UL (ref 1.5–7.7)
NEUTROPHILS # BLD AUTO: 9.74 X10(3) UL (ref 1.5–7.7)
NEUTROPHILS NFR BLD AUTO: 74.5 %
PLATELET # BLD AUTO: 178 10(3)UL (ref 150–450)
RBC # BLD AUTO: 4.83 X10(6)UL (ref 3.8–5.3)
WBC # BLD AUTO: 13.1 X10(3) UL (ref 4–11)

## 2025-05-13 PROCEDURE — 85025 COMPLETE CBC W/AUTO DIFF WBC: CPT | Performed by: OBSTETRICS & GYNECOLOGY

## 2025-05-13 NOTE — PAYOR COMM NOTE
--------------  ADMISSION REVIEW     Payor: [facility]  Subscriber #:  [id number]  Authorization Number: [id number]    Admit date: 25  Admit time:  7:59 PM     Atul Ruiz [[id number]]       Labor Events     labor?: No   steroids?: None  Antibiotics received during labor?: No  Rupture date/time: 2025 0630     Rupture type: SROM  Fluid color: Clear  Labor type: Induced Onset of Labor  Induction: Misoprostol  Indications for induction: Other - comment  Induction comment: AMA  Intrapartum & labor complications: None         Labor Length    1st stage: 1h 40m  2nd stage: 0h 10m  3rd stage: 0h 08m         Labor Event Times    Labor onset date/time: 2025 0945  Dilation complete date/time: 2025 1125  Start pushing date/time: 2025 11:25         [place] Presentation    Presentation: Vertex  Position: Left Occiput Anterior         Operative Delivery    Operative Vaginal Delivery: No                          Shoulder Dystocia    Shoulder Dystocia: No         Anesthesia    Method: Local    Analgesics:  Analgesics   BUPIVACAINE                 Delivery            Head delivery date/time: 2025 11:36:02   Delivery date/time:  25 11:36:06   Delivery type: Normal spontaneous vaginal delivery     Details:      Delivery location: delivery room         Delivery Providers    Delivering Clinician: Damien Pan DO    Delivery personnel:  Provider Role   Anusha Scott, RN Baby Nurse   Kortney Smith, RN Delivery Nurse                Cord    Vessels: 3 Vessels  Complications: None  Timed cord clamping: Yes  Time in sec: 30  Cord blood disposition: to lab  Gases sent?: No         Resuscitation    Method: None          Measurements       Weight: 2840 g 6 lb 4.2 oz Length: 52.1 cm      Head circum.: 33.8 cm Chest circum.: 31 cm       Abdominal circum.: 27 cm               Placenta    Date/time: 2025 11:45  Removal:  Spontaneous  Appearance: Intact  Disposition: held for future pathology         Apgars    Living status: Living    Apgar Scoring Key:    0 1 2     Skin color Blue or pale Acrocyanotic Completely pink     Heart rate Absent <100 bpm >100 bpm     Reflex irritability No response Grimace Cry or active withdrawal     Muscle tone Limp Some flexion Active motion     Respiratory effort Absent Weak cry; hypoventilation Good, crying                1 Minute:  5 Minute:  10 Minute:  15 Minute:  20 Minute:       Skin color: 1  1          Heart rate: 2  2          Reflex irritablity: 2  2          Muscle tone: 2  2          Respiratory effort: 2  2          Total: 9  9             Apgars assigned by: JOSE LUIS ARNOLD RN  [place] disposition: with mother         Skin to Skin    Skin to skin initiated date/time: 2025  Skin to skin with: Mother         Vaginal Count    Initial count RN: Jazmin Chaparro RN  Initial count Tech: Byriene, Gia    Sponges     Sharps     Initial counts 11     0     Final counts 11     2     Final count RN: Kortney Smith RN  Final count MD: Damien Pan DO         Lacerations    Episiotomy: None  Perineal lacerations: 2nd Repaired?: Yes      Vaginal laceration?: No        Cervical laceration?: No     Clitoral laceration?: No     Quantitative blood loss (mL): [address] [place] [address]   part of [facility]     Vaginal Delivery Note           [facility] Sara Patient Status:  Inpatient    8/10/1985 MRN [id number]   Location [facility] LABOR & DELIVERY Attending Candida Willett MD   Hosp Day # 1 PCP Alessia Cho MD      Delivery      Diagnosis: spontaneous vaginal delivery     Labor Details: Induction     Procedure: spontaneous vaginal delivery     Neonatologist Present: no     Placenta  Date/Time of Delivery: 2025 11:45 AM   Delivery: spontaneous  Placenta to Pathology: no  Cord Gases Submitted: no     Cord Complications: none     Maternal Anesthesia: local    Episiotomy/Laceration Repair  Laceration: perineal 2nd degree     Sponge and Needle Counts:  Verified yes     Delivery Complications  none     Hemorrhage?: No, patient is stable, asymptomatic and blood loss is within expected amount following delivery. Standard treatment provided to prevent PPH. Patient does not meet ACOG criteria for hemorrhage at this time.     QBL: Quantitative Blood Loss (mL) 28        Delivery Comments:      Labor Course: Gi Ruiz is a 39 year old  who presented for IOL due to AMA.  Pregnancy further with breast mass concerning for malignancy.  For induction, she received cytotec an further progressed expectantly.     Procedure:  The patient was placed in the dorsal lithotomy position and prepped in the usual fashion.  She was encouraged to push.  The head delivered in the GREGORIA position.  Nuchal cord not present.  The shoulders and body were delivered atraumatically.  The infant was dried and suctioned.  The cord was doubly clamped and cut after 30 second delay.  The cord blood was sampled and/or banked.  The placenta delivered with gentle manual traction on the umbilical cord.  Placenta was noted to be intact with a normal 3 vessel cord.  The cervix and vagina were inspected.  A 2nd degree perineal laceration was noted.  The laceration was repaired with 3-0 vicryl in the usual fashion, after which excellent hemostasis was noted.  The fundus was again examined and noted to be firm and well contracted.  Bleeding was minimal.  The patient was then moved to the supine position in stable condition.  Counts were correct.     Damien Pan DO   2025  11:59 AM      MEDICATIONS ADMINISTERED IN LAST 1 DAY:  benzocaine-menthol (Dermoplast) 20-0.5 % topical spray 1 spray       Date Action Dose Route User    2025 1340 Given 1 spray Topical Jazmin Chaparro, TORO          bupivacaine PF (Marcaine) 0.25% injection       Date Action Dose Route User    2025 1145 Given 30 mL Injection  Kortney Smith, RN          docusate sodium (Colace) cap 100 mg       Date Action Dose Route User    5/12/2025 1720 Given 100 mg Oral Irma Ferreira RN          ibuprofen (Motrin) tab 600 mg       Date Action Dose Route User    5/13/2025 0015 Given 600 mg Oral FernybaJennifer farah RN    5/12/2025 1720 Given 600 mg Oral Irma Ferreira RN          oxyTOCIN in sodium chloride 0.9% (Pitocin) 30 Units/500mL infusion premix       Date Action Dose Route User    5/12/2025 1145 New Bag 300 josé luis-units/min Intravenous Kortney Smith RN          witch hazel-glycerin ( WITCH HAZEL) external pad       Date Action Dose Route User    5/12/2025 1340 Given (none) Topical Jazmin Chaparro RN            Vitals (last day)       Date/Time Temp Pulse Resp BP SpO2 Weight O2 Device O2 Flow Rate (L/min) [facility]    05/13/25 0804 97.6 °F (36.4 °C) 77 16 105/73 99 % -- -- -- KW    05/12/25 1920 98.8 °F (37.1 °C) 67 16 125/70 -- -- -- -- MH    05/12/25 1400 98.2 °F (36.8 °C) 75 16 130/71 -- -- -- -- SM    05/12/25 1330 -- 80 -- 144/58 -- -- -- -- BB    05/12/25 1215 -- 80 -- 147/56 -- -- -- -- BB    05/12/25 1155 97.4 °F (36.3 °C) 93 18 129/69 -- -- -- -- CS    05/12/25 0900 98.1 °F (36.7 °C) 88 -- 124/84 -- -- -- -- NF    05/12/25 0630 98.4 °F (36.9 °C) 80 18 143/78 -- -- None (Room air) -- AM

## 2025-05-13 NOTE — PROGRESS NOTES
Postpartum Day 1    Pt without complaints.    Temp:  [97.4 °F (36.3 °C)-98.8 °F (37.1 °C)] 98.8 °F (37.1 °C)  Pulse:  [67-93] 67  Resp:  [16-18] 16  BP: (124-147)/(56-84) 125/70  abd  soft, NT, ND, fundus firm below umbilicus  perineum NL lochia  extr  trace edema, no calf tenderness    Recent Labs   Lab 05/11/25 2008   RBC 4.61   HGB 12.1   HCT 36.4   MCV 79.0*   MCH 26.2   MCHC 33.2   RDW 14.3   NEPRELIM 6.60   WBC 9.6   .0     Impression: PPD#1  Plan:  Continue postpartum care.    Home today.

## 2025-05-15 ENCOUNTER — TELEPHONE (OUTPATIENT)
Dept: OBGYN UNIT | Facility: HOSPITAL | Age: 40
End: 2025-05-15

## 2025-05-15 NOTE — PROGRESS NOTES
Cradle call completed. Mom and baby care reviewed. All questions answered. Cradle call letters sent via [employer].   no

## 2025-05-19 ENCOUNTER — TELEPHONE (OUTPATIENT)
Dept: MAMMOGRAPHY | Facility: HOSPITAL | Age: 40
End: 2025-05-19

## 2025-05-19 NOTE — TELEPHONE ENCOUNTER
Patient calling us re: recommended breast biopsy from . Patient was pregnant at the time and refused breast biopsy while she was pregnant. Patient has now delivered her baby and is calling to schedule recommended breast biopsy. Case reviewed by Dr Gutierrez and per Dr Gutierrez, patient should have current diagnostic mammogram and ultrasound. Ok to schedule diagnostic exams on the same day as 3 site biopsies.   This Breast Care RN re reviewed biopsy recommendation by Dr. Davenport for left breast 2 site and left axillary lymph node 1 site.  Procedure reviewed and all questions answered. Reviewed educational handouts.  On the day of the biopsy, pt instructed to take Tylenol 1000mg PO, eat a light meal & bring or wear a sports bra.  Post biopsy care also reviewed with pt to include NO lifting more than 5lbs, no exercising or housework (limit upper body movement) for 24-48 hrs post biopsy. Pt verbalized understanding.  Our breast center schedulers will be calling to schedule an appt that is convenient for pt.

## 2025-07-15 ENCOUNTER — TELEPHONE (OUTPATIENT)
Dept: MAMMOGRAPHY | Facility: HOSPITAL | Age: 40
End: 2025-07-15

## 2025-07-15 NOTE — TELEPHONE ENCOUNTER
Called and spoke to patient briefly. Discussed recommended breast biopsy from November 2024. Patient states that she plans to reschedule biopsy. Offered to help or support  patient in any way to help her to get biopsy completed. Patient thankful but would like to call back. She is busy at this time

## 2025-08-26 ENCOUNTER — TELEPHONE (OUTPATIENT)
Dept: MAMMOGRAPHY | Facility: HOSPITAL | Age: 40
End: 2025-08-26

## (undated) NOTE — LETTER
2025      Viry Sue MD  [address]  [address]  [facility] [address]  Via Outside Provider Messaging  Patient: Gi Ruiz  : 8/10/1985    Dear Colleague:  Thank you for referring your patient to me for a Maternal Fetal Medicine evaluation. Please see my attached note for my findings and recommendations.    I am hoping she will proceed with biopsy prior to delivery.      Should you have any questions or concerns, please do not hesitate to contact me at the number listed below.    Best Regards,      Luda Leiva MD  [facility]   [address]  [facility] [address]  [phone]    cc: No Recipients      [facility] Department of Maternal Fetal Medicine  Patient Name: Gi Ruiz  Patient : 8/10/1985  Physician: Luda Leiva MD    Pt here for Growth Ultrasound  +fm noted per patient  Pt denies complaints.     Outpatient Maternal-Fetal Medicine Consultation    Dear Dr. Sue    Thank you for requesting ultrasound evaluation and maternal fetal medicine consultation on your patient Gi Ruiz.  As you are aware she is a 39 year old female  with a [place] pregnancy and an Estimated Date of Delivery: 25.  A maternal-fetal medicine  Her prenatal records and labs were reviewed.      She asks if her baby is growing well.  She wants to make sure her baby is growing well.  Today, she asked ,\"If I had cancer, wouldn't I have symptoms or be losing weight or something.\"  ROS    HISTORY  OB History    Para Term  AB Living   4 3 3     3   SAB IAB Ectopic Multiple Live Births         0 3      # Outcome Date GA Lbr Anastacio/2nd Weight Sex Type Anes PTL Lv   4 Current            3 Term 16 39w1d 04:17 / 00:11 6 lb 9 oz (2.977 kg) M NORMAL SPONT None N VANESSA   2 Term 10/27/14 40w6d 03:41 / 01:03 7 lb 13 oz (3.545 kg) M NORMAL SPONT EPI N VANESSA      Complications: Group B beta strep +   1 Term 05/15/13 40w6d 08:44 / 00:37 6 lb 12 oz (3.062 kg) F NORMAL SPONT  LOCAL N VANESSA      Birth Comments: cord around rgt ankle x2 tight       Allergies:  Allergies[1]   Current Meds:  Current Outpatient Medications   Medication Sig Dispense Refill    prenatal vitamin with DHA 27-0.8-228 MG Oral Cap Take 1 capsule by mouth daily.          HISTORY:  Past Medical History:    Gallbladder stone with nonacute cholecystitis    Right leg pain    chronic---for years--improved with PT      No past surgical history on file.   Family History   Problem Relation Age of Onset    High Blood Pressure Mother       Social History     Socioeconomic History    Marital status:    Tobacco Use    Smoking status: Never    Smokeless tobacco: Never   Vaping Use    Vaping status: Never Used   Substance and Sexual Activity    Alcohol use: No    Drug use: No   Other Topics Concern    Caffeine Concern No    Exercise No    Seat Belt Yes   Social History Narrative        3 children    -homemaker    -no tobacco    -no ETOH    -no illicits    -uses condoms           PHYSICAL EXAMINATION:  /74 (BP Location: Right arm, Patient Position: Sitting, Cuff Size: adult)   Pulse 107   Ht 5' 2\" (1.575 m)   Wt 152 lb (68.9 kg)   LMP 08/06/2024   BMI 27.80 kg/m²   Physical Exam  Constitutional:       Appearance: Normal appearance.   Abdominal:      Palpations: Abdomen is soft.      Tenderness: There is no abdominal tenderness.   Neurological:      Mental Status: She is alert.   Psychiatric:         Mood and Affect: Mood normal.         Behavior: Behavior normal.         OBSTETRIC ULTRASOUND  The patient had a follow-up growth and BPP ultrasound today which revealed normal interval fetal growth and a BPP of 8/8.   Ultrasound Findings:  Single IUP in cephalic presentation.    Placenta is anterior.   A 3 vessel cord is noted.  Cardiac activity is present at 167 bpm  EFW 1939 g ( 4 lb 4 oz); 49%.    YOSELIN is  8.6 cm.  MVP is 3.9 cm  BPP is 8/8.     The fetal measurements are consistent with established EDC. No  gross ultrasound evidence of structural abnormalities are seen today. The patient understands that ultrasound cannot rule out all structural and chromosomal abnormalities.   See PACS/Imaging Tab For Complete Ultrasound Report  I interpreted the results and reviewed them with the patient.    DISCUSSION  During her visit we discussed and reviewed the following issues:  ADVANCED MATERNAL AGE    Background  I reviewed with the patient that pregnancies in women of advanced maternal age (35 or older at delivery) are associated with elevated risks. Specifically, there is a higher rate of:  Fetal malformations  Preeclampsia  Gestational diabetes  Intrauterine fetal death     Please see previous [facility] detailed discussion.     .  Left breast masses:  Please see previous [facility] detailed discussion.     In answer to her question, it depends on the cancer if a patient is losing weight or having symptoms.  Some cancers are asymptomatic , but yet are growing.  Some aggressive cancers will cause rapid weight loss.  She said she is planning to get a mammogram postpartum. \"They have not ever done a mammogram.\"  She asks if the mammogram and ultrasound look at different things.  \"Would the mammogram tell for sure if it is cancer?\"  I went over how mammograms and ultrasound are both used to identify masses and cancers, but only a biopsy can determine for sure if it is cancer.  It also depends on if the breast tissue is dense or not and the location of  the mass.  Often it is the radiologist that determines what is the best way of imaging a mass is.  Even if she has a mammogram, she will likely still need an US.  She then asked if she could breastfeed.  I said that it is a good questions.  In general patients that have breast cancer are on chemotherapy during their pregnancy, and that precludes breastfeeding.  It is likely not a good idea to breast feed in the setting of possible breast cancer.  She then asked if she could just breast feed  off the right breast.  Waiting until the pregnancy to be over will lead to nneding to do imaging while her body is starting to lactace even if she plans not to breastfeed. The best course of action would likely be to consider proceeding with the biopsy and any imaging prior to delivery.  That way, she would know a clear answer if there is a cancer or not and have a plan that can be efficiently started after delivery.  She is considering this option of imaging and/or biopsy prior to delivery.  She will consider her options.          1hr gtt 115.      IMPRESSION:  IUP at 32w1d    AMA --NIPT low risk, declines invasive testing   Left breast masses--highly suggestive of malignancy (BI-RADS 5)    RECOMMENDATIONS:  Continue care with Dr. Sue  Weekly NST's at 32 weeks.  Twice weekly testing at 38 weeks - weekly NST and weekly BPP.  Delivery at 39-40 weeks.  I recommended that she consider proceeding with the biopsy prior to delivery so that she could have a clear plan for postpartum ( if treatment is needed or if breastfeeding is to be avoided.)          Thank you for allowing me to participate in the care of your patient.  Please do not hesitate to contact me if additional questions or concerns arise.      Luda Leiva M.D.    55 minutes spent in review of records, patient consultation, documentation and coordination of care.  The relevant clinical matter(s) are summarized above.     Note to patient and family  The 21st Century Cures Act makes medical notes available to patients in the interest of transparency.  However, please be advised that this is a medical document.  It is intended as [id number] communication.  It is written and medical language may contain abbreviations or verbiage that are technical and unfamiliar.  It may appear blunt or direct.  Medical documents are intended to carry relevant information, facts as evident, and the clinical opinion of the practitioner.       [1] No Known  Allergies

## (undated) NOTE — LETTER
2025      Viry Sue MD  [address]  [address]  [facility] [address]  Via Outside Provider Messaging  Patient: Gi Ruiz  : 8/10/1985    Dear Colleague:  Thank you for referring your patient to me for a Maternal Fetal Medicine evaluation. Please see my attached note for my findings and recommendations.      Should you have any questions or concerns, please do not hesitate to contact me at the number listed below.    Best Regards,      Luda Leiva MD  [facility]   [address]  [facility] [address]  [phone]    cc: No Recipients      [facility] Department of Maternal Fetal Medicine  Patient Name: Gi Ruiz  Patient : 8/10/1985  Physician: Luda Leiva MD    Pt here for growth ultrasound/BPP  + FM  Pt sates feeling irreg uterine cramping, denies vag bleeding and leaking fluid.    Outpatient Maternal-Fetal Medicine Consultation    Dear Dr. Sue    Thank you for requesting ultrasound evaluation and maternal fetal medicine consultation on your patient Gi Ruiz.  As you are aware she is a 39 year old female  with a munroe pregnancy and an Estimated Date of Delivery: 25.  A maternal-fetal medicine  Her prenatal records and labs were reviewed.    Positive fetal movement.   Has not made an phone calls regarding biopsy.  Planning to do this after birth of baby.  ROS    HISTORY  OB History    Para Term  AB Living   4 3 3   3   SAB IAB Ectopic Multiple Live Births      0 3      # Outcome Date GA Lbr Anastacio/2nd Weight Sex Type Anes PTL Lv   4 Current            3 Term 16 39w1d 04:17 / 00:11 6 lb 9 oz (2.977 kg) M NORMAL SPONT None N VANESSA   2 Term 10/27/14 40w6d 03:41 / 01:03 7 lb 13 oz (3.545 kg) M NORMAL SPONT EPI N VANESSA      Complications: Group B beta strep +   1 Term 05/15/13 40w6d 08:44 / 00:37 6 lb 12 oz (3.062 kg) F NORMAL SPONT LOCAL N VANESSA      Birth Comments: cord around rgt ankle x2 tight       Allergies:  Allergies[1]    Current Meds:  Current Outpatient Medications   Medication Sig Dispense Refill    prenatal vitamin with DHA 27-0.8-228 MG Oral Cap Take 1 capsule by mouth in the morning.          HISTORY:  Past Medical History:    Gallbladder stone with nonacute cholecystitis    Right leg pain    chronic---for years--improved with PT      No past surgical history on file.   Family History   Problem Relation Age of Onset    High Blood Pressure Mother       Social History     Socioeconomic History    Marital status:    Tobacco Use    Smoking status: Never    Smokeless tobacco: Never   Vaping Use    Vaping status: Never Used   Substance and Sexual Activity    Alcohol use: No    Drug use: Never   Other Topics Concern    Caffeine Concern No    Exercise No    Seat Belt Yes   Social History Narrative        3 children    -homemaker    -no tobacco    -no ETOH    -no illicits    -uses condoms      Social Drivers of Health     Food Insecurity: No Food Insecurity (4/3/2025)    NCSS - Food Insecurity     Worried About Running Out of Food in the Last Year: No     Ran Out of Food in the Last Year: No   Transportation Needs: No Transportation Needs (4/3/2025)    NCSS - Transportation     Lack of Transportation: No   Stress: No Stress Concern Present (4/3/2025)    Stress     Feeling of Stress : No   Housing Stability: Not At Risk (4/3/2025)    NCSS - Housing/Utilities     Has Housing: Yes     Worried About Losing Housing: No     Unable to Get Utilities: No   Recent Concern: Housing Stability - At Risk (4/3/2025)    NCSS - Housing/Utilities     Has Housing: No     Worried About Losing Housing: No     Unable to Get Utilities: No          PHYSICAL EXAMINATION:  /88 (BP Location: Right arm, Patient Position: Sitting, Cuff Size: adult)   Pulse 96   Ht 5' 2\" (1.575 m)   Wt 157 lb (71.2 kg)   LMP 08/06/2024   BMI 28.72 kg/m²   Physical Exam  Constitutional:       Appearance: Normal appearance.   Abdominal:      Palpations:  Abdomen is soft.      Tenderness: There is no abdominal tenderness.   Neurological:      Mental Status: She is alert.   Psychiatric:         Mood and Affect: Mood normal.         Behavior: Behavior normal.           OBSTETRIC ULTRASOUND  The patient had a follow-up growth and BPP ultrasound today which revealed normal interval fetal growth and a BPP of 8/8.   Ultrasound Findings:  Single IUP in breech presentation.    Placenta is anterior.   A 3 vessel cord is noted.  Cardiac activity is present at 142 bpm  EFW 2729 g ( 6 lb 0 oz); 38%.    YOSELIN is  18.7 cm.  MVP is 6 cm  BPP is 8/8.     The fetal measurements are consistent with established EDC. No gross ultrasound evidence of structural abnormalities are seen today. The patient understands that ultrasound cannot rule out all structural and chromosomal abnormalities.     See PACS/Imaging Tab For Complete Ultrasound Report  I interpreted the results and reviewed them with the patient.    DISCUSSION  During her visit we discussed and reviewed the following issues:  ADVANCED MATERNAL AGE    Background  I reviewed with the patient that pregnancies in women of advanced maternal age (35 or older at delivery) are associated with elevated risks. Specifically, there is a higher rate of:  Fetal malformations  Preeclampsia  Gestational diabetes  Intrauterine fetal death     Please see previous MFM detailed discussion.     .  Left breast masses:  Please see previous [facility] detailed discussion.             1hr gtt 115.    Signs and symptoms of preeclampsia were reviewed.      IMPRESSION:  IUP at 36w1d    AMA --NIPT low risk, declines invasive testing   Left breast masses--highly suggestive of malignancy (BI-RADS 5)    RECOMMENDATIONS:  Continue care with Dr. Sue  Weekly NST's at 32 weeks.  Twice weekly testing at 38 weeks - weekly NST and weekly BPP.  Delivery at 39-40 weeks.  I recommended that she consider proceeding with the biopsy prior to delivery so that she could have a clear  plan for postpartum ( if treatment is needed or if breastfeeding is to be avoided.). She states she has the phone numbers to call for making an appointment for biopsy.)          Thank you for allowing me to participate in the care of your patient.  Please do not hesitate to contact me if additional questions or concerns arise.      Luda Leiva M.D.    20 minutes spent in review of records, patient consultation, documentation and coordination of care.  The relevant clinical matter(s) are summarized above.     Note to patient and family  The 21st Century Cures Act makes medical notes available to patients in the interest of transparency.  However, please be advised that this is a medical document.  It is intended as agww-uq-zgyk communication.  It is written and medical language may contain abbreviations or verbiage that are technical and unfamiliar.  It may appear blunt or direct.  Medical documents are intended to carry relevant information, facts as evident, and the clinical opinion of the practitioner.         [1] No Known Allergies                     [1]  No Known Allergies

## (undated) NOTE — LETTER
2025      Viry Sue MD  [address]  [address]  [facility] [address]  Via Outside Provider Messaging  Patient: Gi Ruiz  : 8/10/1985    Dear Colleague:  Thank you for referring your patient to me for a Maternal Fetal Medicine evaluation. Please see my attached note for my findings and recommendations.      Should you have any questions or concerns, please do not hesitate to contact me at the number listed below.    Best Regards,      Luda Leiva MD  [facility]   [address]  [facility] [address]  [phone]    cc: No Recipients      [facility] Department of Maternal Fetal Medicine  Patient Name: Gi Ruiz  Patient : 8/10/1985  Physician: Luda Leiva MD    Pt here for BPP  + FM  No complaints    Outpatient Maternal-Fetal Medicine Ultrasound    Dear Dr. Sue    Thank you for requesting a standard ultrasound on your patient Gi Ruiz.  As you are aware she is a 39 year old female  with a munroe pregnancy and an Estimated Date of Delivery: 25. She was sent for a biophysical profile only. Physician only visit.    VITAL SIGNS:  Vitals:    25 1435 25 1447 25 1448   BP: 141/88 (!) 142/92 135/88   Pulse: 108 106 101   Weight: 159 lb (72.1 kg)     Height: 5' 2\" (1.575 m)           Body mass index is 29.08 kg/m².    Ultrasound Findings:  Single IUP in cephalic presentation.    Placenta is anterior.   Cardiac activity is present at 135 bpm  MVP is 4.8 cm . YOSELIN 13.5 cm  BPP is 8/8.     See Imaging Tab For Complete Ultrasound Report    IMPRESSION:  IUP at 37w1d  AMA --NIPT low risk, declines invasive testing   Left breast masses--highly suggestive of malignancy (BI-RADS 5)  BPP: 8/8    RECOMMENDATIONS:  Continue care with Dr. Sue  Twice weekly testing at 38 weeks-weekly NST and weekly BPP  Delivery at 38-39 weeks.    Thank you for allowing me to participate in the care of your patient.  Please do not hesitate to contact me if  additional questions or concerns arise.      Luda Leiva MD    Note to patient and family  The 21st Century Cures Act makes medical notes available to patients in the interest of transparency.  However, please be advised that this is a medical document.  It is intended as prup-zk-xbtz communication.  It is written and medical language may contain abbreviations or verbiage that are technical and unfamiliar.  It may appear blunt or direct.  Medical documents are intended to carry relevant information, facts as evident, and the clinical opinion of the practitioner.

## (undated) NOTE — LETTER
2025      Viry Sue MD  [address]  [address]  [facility] [address]  Via Outside Provider Messaging  Patient: Gi Ruiz  : 8/10/1985    Dear Colleague:  Thank you for referring your patient to me for a Maternal Fetal Medicine evaluation. Please see my attached note for my findings and recommendations.      Should you have any questions or concerns, please do not hesitate to contact me at the number listed below.    Best Regards,      Luda Leiva MD  [facility]   [address]  [facility] [address]  [phone]    cc: No Recipients      [facility] Department of Maternal Fetal Medicine  Patient Name: Gi Ruiz  Patient : 8/10/1985  Physician: Luda Leiva MD    Pt here for BPP/No Consult  +fm noted per patient  Pt denies complaints.       Outpatient Maternal-Fetal Medicine Ultrasound    Dear Dr. Sue    Thank you for requesting a standard ultrasound on your patient Gi Ruiz.  As you are aware she is a 39 year old female  with a munroe pregnancy and an Estimated Date of Delivery: 25. She was sent for a biophysical profile only. Physician only visit.    VITAL SIGNS:  Vitals:    25 1033   BP: 117/83   Pulse: 103   Weight: 160 lb (72.6 kg)   Height: 5' 2\" (1.575 m)         Body mass index is 29.26 kg/m².    Ultrasound Findings:  Single IUP in cephalic presentation.    Placenta is anterior.   Cardiac activity is present at 140 bpm  MVP is 6.2 cm . YOSELIN 20.6 cm  BPP is 8/8.     See Imaging Tab For Complete Ultrasound Report    IMPRESSION:  IUP at 38w1d  AMA --NIPT low risk, declines invasive testing   Left breast masses--highly suggestive of malignancy (BI-RADS 5)  BPP: 8/8    RECOMMENDATIONS:  Continue care with Dr. Sue  Twice weekly testing at 38 weeks-weekly NST and weekly BPP  Induction scheduled 25    Thank you for allowing me to participate in the care of your patient.  Please do not hesitate to contact me if additional  questions or concerns arise.      Luda Leiva MD    Note to patient and family  The 21st Century Cures Act makes medical notes available to patients in the interest of transparency.  However, please be advised that this is a medical document.  It is intended as fyxq-ee-umqk communication.  It is written and medical language may contain abbreviations or verbiage that are technical and unfamiliar.  It may appear blunt or direct.  Medical documents are intended to carry relevant information, facts as evident, and the clinical opinion of the practitioner.

## (undated) NOTE — LETTER
Gi Ruiz, :8/10/1985    CONSENT FOR PROCEDURE/SEDATION    1. I authorize the performance upon [facility]  the following: Excision Lesion/Trunk    2.  I authorize EVERARDO Braden (and whomever is designated as the doctor’s assistant), to p Witness: _________________________________________ Date:___________     Physician Signature: _______________________________ Date:___________

## (undated) NOTE — LETTER
January 15, 2025      Viry Sue MD  [address]  [address]  [facility] [address]  Via Outside Provider Messaging  Patient: Gi Ruiz  : 8/10/1985    Dear Colleague:  Thank you for referring your patient to me for a Maternal Fetal Medicine evaluation. Please see my attached note for my findings and recommendations.      Should you have any questions or concerns, please do not hesitate to contact me at the number listed below.    Best Regards,      Luda Leiva MD  [facility]   [address]  [facility] [address]  [phone]    cc: No Recipients      [facility] Department of Maternal Fetal Medicine  Patient Name: Gi Ruiz  Patient : 8/10/1985  Physician: Luda Leiva MD    Pt here for level II ultrasound/doctor consult  + FM  No complaints    Outpatient Maternal-Fetal Medicine Consultation    Dear Dr. Sue    Thank you for requesting ultrasound evaluation and maternal fetal medicine consultation on your patient Gi Ruiz.  As you are aware she is a 39 year old female  with a munroe pregnancy and an Estimated Date of Delivery: 25.  A maternal-fetal medicine consultation was requested secondary to Advanced Maternal Age.  Her prenatal records and labs were reviewed.      Her previous pregnancies were uncomplicated.  She is here today with her mother.  When I asked if she had any health issues, she said no.  I asked about the ultrasound reports and she said that that was concerning.  ROS    HISTORY  OB History    Para Term  AB Living   4 3 3     3   SAB IAB Ectopic Multiple Live Births         0 3      # Outcome Date GA Lbr Anastacio/2nd Weight Sex Type Anes PTL Lv   4 Current            3 Term 16 39w1d 04:17 / 00:11 6 lb 9 oz (2.977 kg) M NORMAL SPONT None N VANESSA   2 Term 10/27/14 40w6d 03:41 / 01:03 7 lb 13 oz (3.545 kg) M NORMAL SPONT EPI N VANESSA      Complications: Group B beta strep +   1 Term 05/15/13 40w6d 08:44 / 00:37 6 lb 12 oz  (3.062 kg) F NORMAL SPONT LOCAL N VANESSA      Birth Comments: cord around rgt ankle x2 tight       Allergies:  Allergies[1]   Current Meds:  Current Outpatient Medications   Medication Sig Dispense Refill    prenatal vitamin with DHA 27-0.8-228 MG Oral Cap Take 1 capsule by mouth daily.          HISTORY:  Past Medical History:    Gallbladder stone with nonacute cholecystitis    Right leg pain    chronic---for years--improved with PT      No past surgical history on file.   Family History   Problem Relation Age of Onset    High Blood Pressure Mother       Social History     Socioeconomic History    Marital status:    Tobacco Use    Smoking status: Never    Smokeless tobacco: Never   Vaping Use    Vaping status: Never Used   Substance and Sexual Activity    Alcohol use: No    Drug use: No   Other Topics Concern    Caffeine Concern No    Exercise No    Seat Belt Yes   Social History Narrative        3 children    -homemaker    -no tobacco    -no ETOH    -no illicits    -uses condoms           PHYSICAL EXAMINATION:  /77 (BP Location: Right arm, Patient Position: Sitting, Cuff Size: adult)   Pulse 114   Ht 5' 2\" (1.575 m)   Wt 145 lb (65.8 kg)   LMP 08/06/2024   BMI 26.52 kg/m²   Physical Exam  Constitutional:       Appearance: Normal appearance.   Abdominal:      Palpations: Abdomen is soft.      Tenderness: There is no abdominal tenderness.   Neurological:      Mental Status: She is alert.   Psychiatric:         Mood and Affect: Mood normal.         Behavior: Behavior normal.           OBSTETRIC ULTRASOUND  The patient had a level II ultrasound today which revealed size consistent with dates and a normal detailed anatomic survey.  Ultrasound Findings:  Single IUP in cephalic presentation.    Placenta is anterior.   A 3 vessel cord is noted.  Cardiac activity is present at 148 bpm   g ( 1 lb 2 oz);   MVP is 4.4 cm .     The fetal measurements are consistent with the established EDC. No  ultrasound evidence of structural abnormalities are seen today. The nasal bone is present. No ultrasound evidence of markers for aneuploidy are seen. She understands that ultrasound exam cannot exclude genetic abnormalities and that genetic testing is recommended. The limitations of ultrasound were discussed.     Uterus and adnexa appeared normal  today on US  See PACS/Imaging Tab For Complete Ultrasound Report  I interpreted the results and reviewed them with the patient.    DISCUSSION  During her visit we discussed and reviewed the following issues:  ADVANCED MATERNAL AGE    Background  I reviewed with the patient that pregnancies in women of advanced maternal age (35 or older at delivery) are associated with elevated risks. Specifically, there is a higher rate of:  Fetal malformations  Preeclampsia  Gestational diabetes  Intrauterine fetal death    As a result, enhanced pregnancy surveillance is advised for these patients including a comprehensive ultrasound to assess for fetal malformations (at 20 weeks) and a third trimester ultrasound assessment for fetal growth (at 32 weeks). In addition, weekly NST's (initiating at 36 weeks gestation for women 35-39 years and at 32 weeks gestation for women 40 years and older) are also advised. Routine obstetric care is more than adequate to assess for gestational diabetes and preeclampsia; hence, no further significant alterations in obstetric care are advised.    Medical Complications    Women 35 years of age or older can expect to experience two to three fold higher rates of hospitalization,  delivery, and pregnancy-related complications when compared to their younger counterparts.  The two most common medical problems complicating these  pregnanccies are hypertension and diabetes.   The incidence of preeclampsia in the general obstetric population is 3 to 4 percent; this increases to 5 to 10 percent in women over age 40 and is as high as 35 percent in women over  age 50.   The incidence of gestational diabetes in the general obstetric population is 3 percent, rising to 7 to 12 percent in women over age 40 and 20 percent in women over age 50.  Women 35 years of age or older are more likely to be delivered by . The  delivery rate in the general obstetric population of the [place] is almost 30 percent, compared to almost 50 percent in women over age 40 to 45 and almost 80 percent in women age 50 to 63.          Fetal Death        A decision analysis tool using data from the [place] [employer]  Database predicted a strategy of weekly antepartum testing and labor induction would lower the risk of unexplained fetal death in women 35 years of age or older. In this model, weekly testing starting at 36 weeks of gestation would drop the risk of fetal death from 5.2 to 1.3 per 1000 pregnancies. While a policy of antepartum testing in older women does increase the chance that a women will be induced (71 inductions per fetal death averted) and thereby increases her risk of having a  delivery, only 14 additional cesareans would need to be performed to avert one unexplained fetal death.  Hence, weekly NST's are advised for women of advanced maternal age; testing should be initiated at 36 weeks for women 35-39 years and at 32 weeks for women 40 years and older.    Fetal Malformations    Cardiac malformations, clubfoot, and diaphragmatic hernia appear to occur with increased frequency in offspring of older women. These abnormalities are structural and unrelated to aneuploidy, thus they would not be detected by karyotype analysis.  For these reasons a complete, detailed ultrasound (level II) is advised even if the fetus has a normal karyotype.      Fetal Aneuploidy      Invasive Testing  I offered invasive genetic testing (amniocentesis, chorionic villus sampling) after reviewing the diagnostic accuracy of these tests as well as the procedure associated  loss rate (1:500 for genetic amniocentesis).    She ultimately does not desire invasive genetic testing.     We discussed  the increased risk of chromosomal abnormalities associated with advanced maternal age at age  39 year old. She understands that ultrasound exam cannot exclude potential genetic abnormalities.  Her estimated risk based on maternal age at term with any chromosome abnormality is about 1: 38 and with Down Syndrome is about 1: 54.   We also discussed the risks and benefits of having  genetic testing (CVS and amniocentesis) performed.      Non-invasive Pregnancy Testing (NIPT)  I reviewed current non-invasive screening options. Currently non-invasive pregnancy testing (NIPT) offers the highest detection rate (with the lowest false positive rate) for the detection of fetal aneuploidy amongst high-risk patients. The limitations of detailed mid-trimester sonography was reviewed with the patient. First trimester screening and second trimester multiple-marker serum serum screening as alternative aneuploidy screening options were also reviewed. However, both of these tests are associated with lower detection and higher false positive rates.    I printed the ultrasound report for her to see that notes 2 masses and a lymph node.  Ultrasound-guided biopsy is recommended for all 3.  She understands and voices understanding of the masses being highly suggestive of malignancy.  Ada shaw stated that she has come to the decision that she does not want a proceed with any biopsy or treatment prior to the end of the pregnancy.  She states that she has done a lot of self prior, self meditation, a lot of thought prior to making this decision.  She said it is quite stressful when doctors and everyone she encounters are encouraging her to do 1 thing while her heart is telling her to do another. she states this is the right decision for her heart.     I went over with her that I recommended that she get the biopsy.  We needed  the biopsies to determine if there is malignancy.  Biopsies can be done during pregnancy chemotherapy in general for breast cancer can be used during pregnancy.  The baby has already formed all of its organs, therefore there is not a concern for maldevelopment of an organ.  There can be a risk of a small baby, and we would do monthly growth.  There is a small risk of stillbirth but she also has that risk based on her age.  She again declined stating that she did not want to move forward with further testing or treatment during the pregnancy.  She is relying on spiritual healing and states that that takes time.  I went over with her that this could increase the risk for poor outcome for her which could include an early death.    I told her that if she changes her mind to please let us know as we are ready to help her.            IMPRESSION:  IUP at 23w1d   AMA --NIPT low risk, declines invasive testing   Left breast masses--highly suggestive of malignancy (BI-RADS 5)    RECOMMENDATIONS:  Continue care with Dr. Sue  Follow-up Growth ultrasound with BPP at 32 weeks.  Weekly NST's at 32 weeks.  Twice weekly testing at 38 weeks - weekly NST and weekly BPP.  Delivery at 39-40 weeks.  Declines biopsies of breast masses.      Thank you for allowing me to participate in the care of your patient.  Please do not hesitate to contact me if additional questions or concerns arise.      Luda Leiva M.D.    55 minutes spent in review of records, patient consultation, documentation and coordination of care.  The relevant clinical matter(s) are summarized above.     Note to patient and family  The 21st Century Cures Act makes medical notes available to patients in the interest of transparency.  However, please be advised that this is a medical document.  It is intended as [id number] communication.  It is written and medical language may contain abbreviations or verbiage that are technical and unfamiliar.  It may appear blunt or  direct.  Medical documents are intended to carry relevant information, facts as evident, and the clinical opinion of the practitioner.       [1] No Known Allergies

## (undated) NOTE — LETTER
May 8, 2025      Viry Sue MD  [address]  [address]  [facility] [address]  Via Outside Provider Messaging  Patient: Gi Ruiz  : 8/10/1985    Dear Colleague:  Thank you for referring your patient to me for a Maternal Fetal Medicine evaluation. Please see my attached note for my findings and recommendations.      Should you have any questions or concerns, please do not hesitate to contact me at the number listed below.    Best Regards,      Saleem Renteria MD  [facility]  [address]  [facility] [address]  [phone]    cc: No Recipients      [facility] Department of Maternal Fetal Medicine  Patient Name: Gi Ruiz  Patient : 8/10/1985  Physician: Saleem Renteria MD    Outpatient Maternal-Fetal Medicine Follow-Up    Dear Dr. Sue    Thank you for requesting ultrasound evaluation and maternal fetal medicine consultation on your patient Gi Ruiz.  As you are aware she is a 39 year old female  with a munroe pregnancy and an Estimated Date of Delivery: 25.  She returned to maternal-fetal medicine today for a follow-up visit.  Her history was reviewed from her prior visit and there were no interval changes.    Antepartum Risk Factors  AMA --NIPT low risk, declines invasive testing   Left breast masses--highly suggestive of malignancy (BI-RADS 5)    PHYSICAL EXAMINATION:  Ht 5' 2\" (1.575 m)   Wt 162 lb (73.5 kg)   LMP 2024   BMI 29.63 kg/m²   General: alert and oriented, no acute distress  Abdomen: gravid, soft, non-tender  Extremities: non-tender, no edema    OBSTETRIC ULTRASOUND    Ultrasound Findings:  Single IUP in cephalic presentation.    Placenta is anterior.   A 3 vessel cord is noted.  Cardiac activity is present at 136 bpm  MVP is 6.4 cm . YOSELIN 17.3 cm  BPP is 8/8.     See PACS/Imaging Tab For Complete Ultrasound Report  I interpreted the results and reviewed them with the patient.    DISCUSSION  During her visit we discussed and  reviewed the following issues:  ADVANCED MATERNAL AGE  See prior MFM notes for a detailed review.  She did not desire invasive genetic testing.   She has already obtained a low-risk NPIT result and was appropriately reassured.     LEFT BREAST MASS  Per Dr. Sue's note:    Addressed breast mass and need for diagnostic biopsy. Pt believes in spiritual healing and is aware that repeat US may delay a diagnotic test and further delay treatment if needed. We discussed that delay in diagnosis and treatment can impact prognosis. Pt wants repeat breast US at [facility] prior to biopsy. Paper order provided.    IMPRESSION:  IUP at 39w2d  AMA --NIPT low risk, declines invasive testing   Left breast masses--highly suggestive of malignancy (BI-RADS 5)  BPP: 8/8     RECOMMENDATIONS:  Continue care with Dr. Sue  Twice weekly testing at 38 weeks-weekly NST and weekly BPP  Induction scheduled 5/11/25    Thank you for allowing me to participate in the care of your patient.  Please do not hesitate to contact me if additional questions or concerns arise.      Saleem Renteria M.D.    20 minutes spent in review of records, patient consultation, documentation and coordination of care.  The relevant clinical matter(s) are summarized above.     Note to patient and family  The 21st Century Cures Act makes medical notes available to patients in the interest of transparency.  However, please be advised that this is a medical document.  It is intended as [id number] communication.  It is written and medical language may contain abbreviations or verbiage that are technical and unfamiliar.  It may appear blunt or direct.  Medical documents are intended to carry relevant information, facts as evident, and the clinical opinion of the practitioner.      Pt here for BPP  + FM  No complaints